# Patient Record
Sex: MALE | Race: WHITE
[De-identification: names, ages, dates, MRNs, and addresses within clinical notes are randomized per-mention and may not be internally consistent; named-entity substitution may affect disease eponyms.]

---

## 2017-06-13 ENCOUNTER — HOSPITAL ENCOUNTER (EMERGENCY)
Dept: HOSPITAL 25 - UCEAST | Age: 57
Discharge: HOME | End: 2017-06-13
Payer: COMMERCIAL

## 2017-06-13 VITALS — SYSTOLIC BLOOD PRESSURE: 116 MMHG | DIASTOLIC BLOOD PRESSURE: 66 MMHG

## 2017-06-13 DIAGNOSIS — S61.214A: Primary | ICD-10-CM
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
## 2017-06-13 DIAGNOSIS — Z79.82: ICD-10-CM
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
## 2017-06-13 PROCEDURE — G0463 HOSPITAL OUTPT CLINIC VISIT: HCPCS

## 2017-06-13 PROCEDURE — 99212 OFFICE O/P EST SF 10 MIN: CPT

## 2017-06-13 NOTE — UC
Skin Complaint HPI





- HPI Summary


HPI Summary: 





PATIENT PRESENTS TO  WITH SMALL .5CM LACERATION TO THE DISTAL TIP OF THE 

RIGHT RING FINGER 2 DAYS.  TODAY, HE NOTES TO WORSENING PAIN AND SLIGHT REDNESS 

AROUND THE AREA WITHOUT RADIATION OF PAIN.  DENIES NUMBNESS, TINGLING.  TAKES 

BP MEDICATION AND DAILY ASPIRIN.  HAS NOT TAKEN ANYTHING FOR THE PAIN.  HE HAS 

WRAPPED WITH GAUZE 2 DAYS.





- History of Current Complaint


Chief Complaint: UCLaceration


Time Seen by Provider: 06/13/17 14:41


Stated Complaint: FINGER LACERATION


Hx Obtained From: Patient


Onset/Duration: Gradual Onset


Skin Exposure Onset/Duration: Minutes Ago


Timing: Constant


Onset Severity: Moderate


Current Severity: Mild


Pain Intensity: 2


Pain Scale Used: 0-10 Numeric


Location: Hand (Right)


Character: Pain


Aggravating: Nothing


Alleviating: Nothing


Associated Signs & Symptoms: Positive: Negative


Related History: Trauma





- Allergy/Home Medications


Allergies/Adverse Reactions: 


 Allergies











Allergy/AdvReac Type Severity Reaction Status Date / Time


 


Gabapentin AdvReac Severe Hallucinati Verified 06/13/17 13:19





   ons  














Review of Systems


Constitutional: Negative


Skin: Other


Eyes: Negative


Respiratory: Negative


Cardiovascular: Negative


Motor: Negative


Neurovascular: Negative


Musculoskeletal: Negative


Neurological: Negative


Psychological: Negative


All Other Systems Reviewed And Are Negative: Yes





PMH/Surg Hx/FS Hx/Imm Hx


Previously Healthy: Yes





- Surgical History


Surgical History: Yes


Surgery Procedure, Year, and Place: orth, HIP AND FEMUR RECONSTRUCTION





- Family History


Known Family History: Positive: Unknown





- Social History


Occupation: Employed Full-time


Lives: With Family


Alcohol Use: Occasionally


Substance Use Type: None


Smoking Status (MU): Never Smoked Tobacco


Have You Smoked in the Last Year: No





- Immunization History


Most Recent Tetanus Shot: 2012





Physical Exam


Triage Information Reviewed: Yes


Appearance: Well-Appearing, No Pain Distress, Well-Nourished


Vital Signs: 


 Initial Vital Signs











Temp  97.8 F   06/13/17 13:15


 


Pulse  65   06/13/17 13:15


 


Resp  16   06/13/17 13:15


 


BP  116/66   06/13/17 13:15


 


Pulse Ox  100   06/13/17 13:15











Vital Signs Reviewed: Yes


Eye Exam: Normal


Eyes: Positive: Conjunctiva Clear


Neck exam: Normal


Neck: Positive: Supple, Nontender, No Lymphadenopathy


Respiratory Exam: Normal


Respiratory: Positive: Chest non-tender, Lungs clear


Cardiovascular Exam: Normal


Cardiovascular: Positive: RRR


Neurological Exam: Normal


Neurological: Positive: Alert


Psychological Exam: Normal


Psychological: Positive: Normal Response To Family


Skin: Positive: Other - SMALL .5CM LACERATION TO THE DISTAL TIP OF THE RIGHT 

RING FINGER WITH SLIGHT ERYTHEMA.





Course/Dx





- Course


Course Of Treatment: 2 DAYS S/P LACERATION TO THE FINGER BY KASHIF.  LAST 

TETANUS 2012. SMALL .5CM LACERATION TO THE DISTAL TIP OF THE RIGHT RING FINGER 

WITH SLIGHT ERYTHEMA.  WORSENING PAIN.  KEFLEX GIVEN FOR POSS CELLULITIS.





- Differential Diagnoses - Skin Complaint


Differential Diagnoses: Cellulitis, Other - INFECTION, LACERATION, PUNCTURE 

WOUND, ABRASION





- Diagnoses


Provider Diagnoses: LACERATION WITHOUT REPAIR





Discharge





- Discharge Plan


Condition: Stable


Disposition: HOME


Prescriptions: 


Cephalexin CAP* [Keflex CAP*] 250 mg PO QID #20 cap


Patient Education Materials:  Cellulitis (ED)


Referrals: 


Deuce Quinones MD [Primary Care Provider] - 


Additional Instructions: 


FOLLOW UP AS NEEDED


IF YOU DEVELOP ANY RED STREAKING, DRAINAGE, WARMTH OR SWELLING, RETURN TO  

IMMEDIATELY.

## 2019-01-24 ENCOUNTER — HOSPITAL ENCOUNTER (EMERGENCY)
Dept: HOSPITAL 25 - ED | Age: 59
Discharge: HOME | End: 2019-01-24
Payer: COMMERCIAL

## 2019-01-24 VITALS — DIASTOLIC BLOOD PRESSURE: 76 MMHG | SYSTOLIC BLOOD PRESSURE: 122 MMHG

## 2019-01-24 DIAGNOSIS — I10: ICD-10-CM

## 2019-01-24 DIAGNOSIS — I48.91: ICD-10-CM

## 2019-01-24 DIAGNOSIS — E11.9: ICD-10-CM

## 2019-01-24 DIAGNOSIS — R00.2: Primary | ICD-10-CM

## 2019-01-24 DIAGNOSIS — I44.30: ICD-10-CM

## 2019-01-24 LAB
ALBUMIN SERPL BCG-MCNC: 4.2 G/DL (ref 3.2–5.2)
ALBUMIN/GLOB SERPL: 1.9 {RATIO} (ref 1–3)
ALP SERPL-CCNC: 48 U/L (ref 34–104)
ALT SERPL W P-5'-P-CCNC: 27 U/L (ref 7–52)
ANION GAP SERPL CALC-SCNC: 7 MMOL/L (ref 2–11)
AST SERPL-CCNC: 16 U/L (ref 13–39)
BASOPHILS # BLD AUTO: 0.2 10^3/UL (ref 0–0.2)
BUN SERPL-MCNC: 21 MG/DL (ref 6–24)
BUN/CREAT SERPL: 20.8 (ref 8–20)
CALCIUM SERPL-MCNC: 9.1 MG/DL (ref 8.6–10.3)
CHLORIDE SERPL-SCNC: 106 MMOL/L (ref 101–111)
EOSINOPHIL # BLD AUTO: 0.3 10^3/UL (ref 0–0.6)
GLOBULIN SER CALC-MCNC: 2.2 G/DL (ref 2–4)
GLUCOSE SERPL-MCNC: 186 MG/DL (ref 70–100)
HCO3 SERPL-SCNC: 23 MMOL/L (ref 22–32)
HCT VFR BLD AUTO: 43 % (ref 42–52)
HGB BLD-MCNC: 14.6 G/DL (ref 14–18)
INR PPP/BLD: 0.96 (ref 0.77–1.02)
LYMPHOCYTES # BLD AUTO: 2 10^3/UL (ref 1–4.8)
MCH RBC QN AUTO: 30 PG (ref 27–31)
MCHC RBC AUTO-ENTMCNC: 34 G/DL (ref 31–36)
MCV RBC AUTO: 87 FL (ref 80–94)
MONOCYTES # BLD AUTO: 1.1 10^3/UL (ref 0–0.8)
NEUTROPHILS # BLD AUTO: 3.5 10^3/UL (ref 1.5–7.7)
NRBC # BLD AUTO: 0 10^3/UL
NRBC BLD QL AUTO: 0
PLATELET # BLD AUTO: 182 10^3/UL (ref 150–450)
POTASSIUM SERPL-SCNC: 3.9 MMOL/L (ref 3.5–5)
PROT SERPL-MCNC: 6.4 G/DL (ref 6.4–8.9)
RBC # BLD AUTO: 4.91 10^6/UL (ref 4–5.4)
SODIUM SERPL-SCNC: 136 MMOL/L (ref 135–145)
TROPONIN I SERPL-MCNC: 0 NG/ML (ref ?–0.04)
TSH SERPL-ACNC: 5.62 MCIU/ML (ref 0.34–5.6)
WBC # BLD AUTO: 7 10^3/UL (ref 3.5–10.8)

## 2019-01-24 PROCEDURE — 71045 X-RAY EXAM CHEST 1 VIEW: CPT

## 2019-01-24 PROCEDURE — 36415 COLL VENOUS BLD VENIPUNCTURE: CPT

## 2019-01-24 PROCEDURE — 84443 ASSAY THYROID STIM HORMONE: CPT

## 2019-01-24 PROCEDURE — 80053 COMPREHEN METABOLIC PANEL: CPT

## 2019-01-24 PROCEDURE — 85610 PROTHROMBIN TIME: CPT

## 2019-01-24 PROCEDURE — 84484 ASSAY OF TROPONIN QUANT: CPT

## 2019-01-24 PROCEDURE — 83880 ASSAY OF NATRIURETIC PEPTIDE: CPT

## 2019-01-24 PROCEDURE — 96374 THER/PROPH/DIAG INJ IV PUSH: CPT

## 2019-01-24 PROCEDURE — 85025 COMPLETE CBC W/AUTO DIFF WBC: CPT

## 2019-01-24 PROCEDURE — 83605 ASSAY OF LACTIC ACID: CPT

## 2019-01-24 PROCEDURE — 99284 EMERGENCY DEPT VISIT MOD MDM: CPT

## 2019-01-24 PROCEDURE — 93005 ELECTROCARDIOGRAM TRACING: CPT

## 2019-01-24 NOTE — XMS REPORT
Continuity of Care Document (CCD)

 Created on:2019



Patient:Ameya Palomo

Sex:Male

:1960

External Reference #:[id number]





Demographics







 Address  [address]



   [place] [address]

 

 Home Phone  [phone]

 

 Mobile Phone  [phone]

 

 Email Address  [email or web address]

 

 Preferred Language  en

 

 Marital Status  Not  or 

 

 Restorationism Affiliation  Unknown

 

 Race  White

 

 Ethnic Group  Not  or 









Author







 Name  Ines Emperatriz









Support







 Name  Relationship  Address  Phone

 

 Brittney Palomo  Wife  [address]  [phone]



     [place] [address]  









Care Team Providers







 Name  Role  Phone

 

 Deuce Quinones MD  Primary Care Physician  Unavailable









Payers







 Type  Date  Identification Numbers  Payment Provider  Subscriber

 

     Policy Number: [id number]  [employer]  Ameya Palomo









 PayID: [id number]   [address] [id number]









 [place] [address]







Advance Directives







 Description

 

 No Information Available







Problems







 Date  Description  Provider  Status

 

 Onset: 2014  Atrial fibrillation  Rosa Painting M.D.  Active

 

 Onset: 2014  Aneurysm of thoracic aorta  Rosa Painting M.D.  Active

 

 Onset: 2014  Mixed hyperlipidemia  Rosa Painting M.D.  Active

 

 Onset: 2014  Benign essential hypertension  Rosa Painting M.D.  Active

 

 Onset: 2014  Angina pectoris  Rosa Painting M.D.  Active

 

 Onset: 10/30/2014  Hypersomnia with sleep apnea  Zak Gordon M.D.  Active

 

 Onset: 2015  Obstructive sleep apnea syndrome  Zak Gordon M.D.  
Active

 

 Onset: 2015  Atrial flutter  Rosa Painting M.D.  Active

 

 Onset: 2015  Essential hypertension  Rosa Painting M.D.  Active

 

 Onset: 2017  Paroxysmal atrial fibrillation  Rosa Painting M.D.  Active







Family History







 Date  Family Member(s)  Problem(s)  Comments

 

   Father  Lung Cancer  

 

   Father  ALS  

 

   Father  Chronic Obstructive Pulmonary Disease (COPD)  

 

   Mother  Diabetes  

 

   Mother  Cerebrovascular Accident (CVA)  

 

   Mother  Rheumatiod arthritis  







Social History







 Type  Date  Description  Comments

 

 Birth Sex    Unknown  

 

 Marital Status      

 

 Lives With    Wife  

 

 Lives With    Children  3 children

 

 Occupation    Currently Working  computer support

 

 Tobacco Use  Start: Unknown  Never Smoked Cigarettes  

 

 Smoking Status  Reviewed: 19  Never Smoked Cigarettes  

 

 ETOH Use    Rarely consumes beer  

 

 Tobacco Use  Start: Unknown  Patient has never smoked  

 

 Recreational Drug Use    Denies Drug Use  

 

 Exercise Type/Frequency    Does not exercise  







Allergies, Adverse Reactions, Alerts







 Date  Description  Reaction  Status  Severity  Comments

 

 2014  Gabapentin  hallucinations  Active  Severe  per [facility] EMR chart

 

 2014  Metoprolol    Active    Per Dr. Allison see paper chart

 

 2013  NKDA    Inactive    

 

 2014  NKDA    Inactive    







Medications







 Medication  Date  Status  Form  Strength  Qnty  SIG  Indications  Ordering



                 Provider

 

 Metformin HCL    Active  Tablets  500mg    1 by    Unknown



   016          mouth    



             once a    



             day in    



             the a.m.    

 

 Propafenone    Active  Tablets  150mg  270tab  1 tablet  I48.0  Rosa



 HCL  014        s  by mouth    Garima,



             three    M.D.



             times a    



             day    

 

 Potassium    Active  Capsules ER  10Meq  180cap  2 tablets    Rosa



 Chloride ER  014        s  by mouth    Cecil,



             every day    M.D.

 

 Benazepril HCL    Active  Tablets  5mg  90tabs  1 tab by  I10  Rosa



   014          mouth    Garima,



             every day    M.D.



             hs.    

 

 Cardizem CD    Active  Caps ER  120mg  180cap  1 by  I48.0  Rosa



   014    24HR    s  mouth    Cecil,



             twice a    M.D.



             day    

 

 Pravachol    Active  Tablets  40mg  90tabs  1 tablet    Unknown



   000          by mouth    



             every    



             night at    



             bedtime    

 

 Aspirin    Active  Tablets  81mg    1 by    Unknown



   000          mouth    



             every day    

 

 Aleve    Active  Capsules  220mg  60caps  1 by    Unknown



   000          mouth    



             twice a    



             day as    



             needed    

 

 Cpap    Active  Device          Unknown



   000              

 

 Doxycycline    Active  Capsules DR  40mg    q tab    Unknown



   000          daily    



             util    



                 

 

                 

 

 Propafenone    Hx  Caps ER  225mg  180cap  1 by  427.31  Rosa



 HCL ER  014 -    12HR    s  mouth    Garima,



             twice a    M.D.



   015          day    

 

 Multaq    Hx  Tablets  400mg  180tab  1 by    Rosa



   014 -        s  mouth    Cecil,



             twice a    M.D.



   014          day    

 

 Flexeril    Hx  Tablets  10mg  60tabs  1 po tid    Alvarado



   012 -          prn    Lashaun,



   05/10/2              M.JERSON



   016              

 

 Terazosin HCL  0  Hx  Capsules  1mg  270cap  1 po    Unknown



   000 -        s  qhs(pt    



             stopped    



   017          taking 1    



             month    



             ago)    

 

 Benazepril HCL    Hx  Tablets  20mg  30tabs  by mouth  427.31  Unknown



   000 -          every day    



                 



   014              

 

 Diazepam  0  Hx    120mg    1 twice    Unknown



   000 -          as day    



                 



   016              

 

 Oxycodone HCL  0  Hx        rarely    Unknown



   000 -          prn for    



   05/10/2          leg    



   016          cramps    







Immunizations







 Description

 

 No Information Available







Vital Signs







 Date  Vital  Result  Comment

 

 2019  8:33am  Height  72 inches  6'0"









 Weight  213.00 lb  

 

 Heart Rate  62 /min  

 

 BP Systolic Sitting  115 mmHg  Lue reg cuff

 

 BP Diastolic Sitting  75 mmHg  Lue reg cuff

 

 BP Systolic Standing  112 mmHg  Lue

 

 BP Diastolic Standing  70 mmHg  Lue

 

 Respiratory Rate  18 /min  

 

 BMI (Body Mass Index)  28.9 kg/m2  









 2019  8:56am  Height  72 inches  6'0"









 Weight  213.00 lb  

 

 Heart Rate  56 /min  

 

 BP Systolic Sitting  120 mmHg  Lue large cuff

 

 BP Diastolic Sitting  80 mmHg  Lue large cuff

 

 Respiratory Rate  16 /min  

 

 O2 % BldC Oximetry  98 %  On Ra

 

 BMI (Body Mass Index)  28.9 kg/m2  









 2018  9:35am  Height  72 inches  6'0"









 Weight  206.00 lb  with shoes

 

 Heart Rate  60 /min  

 

 BP Systolic Sitting  128 mmHg  Rue reg cuff

 

 BP Diastolic Sitting  70 mmHg  Rue reg cuff

 

 BP Systolic Standing  122 mmHg  Rue reg cuff

 

 BP Diastolic Standing  78 mmHg  Rue reg cuff

 

 Respiratory Rate  16 /min  

 

 BMI (Body Mass Index)  27.9 kg/m2  

 

 Ejection Fraction  55-60%  as of 17 echo









 12/15/2017  1:56pm  Height  72 inches  6'0"









 Weight  215.00 lb  with shoes

 

 Heart Rate  64 /min  

 

 BP Systolic Sitting  120 mmHg  Rue lg cuff

 

 BP Diastolic Sitting  70 mmHg  Rue lg cuff

 

 BP Systolic Standing  120 mmHg  Rue lg cuff

 

 BP Diastolic Standing  72 mmHg  Rue lg cuff

 

 Respiratory Rate  17 /min  

 

 BMI (Body Mass Index)  29.2 kg/m2  









 2017  8:53am  Height  72 inches  6'0"









 Weight  208.00 lb  without shoes

 

 Heart Rate  62 /min  

 

 BP Systolic Sitting  132 mmHg  Rue reg cuff

 

 BP Diastolic Sitting  80 mmHg  Rue reg cuff

 

 BP Systolic Standing  124 mmHg  Rue reg cuff

 

 BP Diastolic Standing  80 mmHg  Rue reg cuff

 

 Respiratory Rate  16 /min  

 

 BMI (Body Mass Index)  28.2 kg/m2  

 

 Ejection Fraction  50-55%  date 2016 ECHO









 2017  9:47am  Height  70 inches  5'10"









 Heart Rate  61 /min  

 

 BP Systolic Sitting  116 mmHg  

 

 BP Diastolic Sitting  70 mmHg  

 

 Respiratory Rate  14 /min  

 

 Pain Level  0  

 

 O2 % BldC Oximetry  96 %  









 2016  8:13am  Height  70 inches  5'10"









 Weight  211.00 lb  No shoes

 

 Heart Rate  58 /min  

 

 BP Systolic Sitting  118 mmHg  Rue lrg cuff

 

 BP Diastolic Sitting  72 mmHg  Rue lrg cuff

 

 BP Systolic Standing  110 mmHg  Rue lrg cuff

 

 BP Diastolic Standing  74 mmHg  Rue lrg cuff

 

 Respiratory Rate  15 /min  

 

 BMI (Body Mass Index)  30.3 kg/m2  

 

 Ejection Fraction  50-55%  2016









 06/10/2016  8:07am  Height  70 inches  5'10"









 Weight  215.00 lb  w/ shoes

 

 Heart Rate  58 /min  reg

 

 BP Systolic Sitting  110 mmHg  Rue, lg cuff

 

 BP Diastolic Sitting  72 mmHg  Rue, lg cuff

 

 BP Systolic Standing  104 mmHg  Rue

 

 BP Diastolic Standing  70 mmHg  Rue

 

 Respiratory Rate  16 /min  

 

 BMI (Body Mass Index)  30.8 kg/m2  

 

 Ejection Fraction  50-55%  as of 16 echo









 2016  8:52am  Height  70 inches  5'10"









 Weight  213.00 lb  

 

 Heart Rate  61 /min  

 

 BP Systolic Sitting  130 mmHg  

 

 BP Diastolic Sitting  76 mmHg  

 

 Respiratory Rate  18 /min  

 

 O2 % BldC Oximetry  98 %  

 

 BMI (Body Mass Index)  30.6 kg/m2  









 2015 12:07pm  Height  70 inches  5'10"









 Weight  213.00 lb  with boots

 

 Heart Rate  80 /min  

 

 BP Systolic Sitting  128 mmHg  Ra reg cuff

 

 BP Diastolic Sitting  80 mmHg  Ra reg cuff

 

 BP Systolic Standing  128 mmHg  Ra reg cuff

 

 BP Diastolic Standing  82 mmHg  Ra reg cuff

 

 Respiratory Rate  17 /min  

 

 BMI (Body Mass Index)  30.6 kg/m2  

 

 Ejection Fraction  50%  date 14 ECHO









 2015  8:44am  Height  70 inches  5'10"









 Heart Rate  58 /min  

 

 BP Systolic  122 mmHg  

 

 BP Diastolic  82 mmHg  

 

 Respiratory Rate  14 /min  

 

 O2 % BldC Oximetry  98 %  









 2015 10:22am  Height  70 inches  5'10"









 Weight  213.50 lb  

 

 Heart Rate  62 /min  reg

 

 BP Systolic Sitting  124 mmHg  Ra, reg cuff

 

 BP Diastolic Sitting  90 mmHg  Ra, reg cuff

 

 BP Systolic Standing  124 mmHg  Ra

 

 BP Diastolic Standing  90 mmHg  Ra

 

 Respiratory Rate  16 /min  

 

 BMI (Body Mass Index)  30.6 kg/m2  

 

 Ejection Fraction  50%  14  9:00am  Heart Rate  56 /min  









 BP Systolic Sitting  142 mmHg  

 

 BP Diastolic Sitting  79 mmHg  

 

 Respiratory Rate  20 /min  

 

 O2 % BldC Oximetry  97 %  









 2015 10:04am  Height  70 inches  5'10"









 Weight  222.00 lb  with shoes

 

 Heart Rate  70 /min  

 

 BP Systolic Sitting  140 mmHg  LA, Lg cuff

 

 BP Diastolic Sitting  96 mmHg  LA, Lg cuff

 

 BP Systolic Standing  134 mmHg  LA

 

 BP Diastolic Standing  92 mmHg  LA

 

 Respiratory Rate  16 /min  

 

 BMI (Body Mass Index)  31.9 kg/m2  









 2015  2:45pm  Height  70 inches  5'10"









 Weight  225.00 lb  

 

 Heart Rate  66 /min  

 

 BP Systolic Sitting  136 mmHg  

 

 BP Diastolic Sitting  70 mmHg  

 

 Respiratory Rate  20 /min  

 

 O2 % BldC Oximetry  98 %  

 

 BMI (Body Mass Index)  32.3 kg/m2  









 2015  8:38am  Height  72 inches  6'0"









 Weight  224.00 lb  with shoes

 

 Heart Rate  66 /min  regular

 

 BP Systolic Sitting  122 mmHg  right arm reg cuff

 

 BP Diastolic Sitting  86 mmHg  right arm reg cuff

 

 BP Systolic Standing  120 mmHg  right arm reg cuff

 

 BP Diastolic Standing  88 mmHg  right arm reg cuff

 

 Respiratory Rate  18 /min  

 

 BMI (Body Mass Index)  30.4 kg/m2  









 2014  7:55am  Height  72 inches  6'0"









 Weight  217.00 lb  no shoes

 

 Heart Rate  72 /min  

 

 BP Systolic Sitting  120 mmHg  Ra, reg cuff

 

 BP Diastolic Sitting  80 mmHg  Ra, reg cuff

 

 BP Systolic Standing  126 mmHg  

 

 BP Diastolic Standing  80 mmHg  

 

 Respiratory Rate  16 /min  

 

 BMI (Body Mass Index)  29.4 kg/m2  









 10/30/2014  8:23am  Height  72 inches  6'0"









 Weight  215.00 lb  

 

 Heart Rate  66 /min  

 

 BP Systolic Sitting  114 mmHg  left arm, large cuff

 

 BP Diastolic Sitting  82 mmHg  left arm, large cuff

 

 Respiratory Rate  16 /min  

 

 O2 % BldC Oximetry  97 %  Room air

 

 BMI (Body Mass Index)  29.2 kg/m2  

 

 Neck Circumference in inches  17.25  









 2014  8:28am  Height  72 inches  6'0"









 Weight  218.00 lb  without shoes

 

 Heart Rate  68 /min  

 

 BP Systolic Sitting  130 mmHg  L arm reg cuff

 

 BP Diastolic Sitting  82 mmHg  L arm reg cuff

 

 BP Systolic Standing  130 mmHg  

 

 BP Diastolic Standing  76 mmHg  

 

 Respiratory Rate  18 /min  

 

 BMI (Body Mass Index)  29.6 kg/m2  









 2014  8:40am  Height  72 inches  6'0"









 Weight  219.00 lb  without shoes

 

 Heart Rate  60 /min  

 

 BP Systolic Sitting  124 mmHg  LA lg cuff

 

 BP Diastolic Sitting  86 mmHg  LA lg cuff

 

 BP Systolic Lying Down  122 mmHg  LA lg cuff Stand

 

 BP Diastolic Lying Down  86 mmHg  LA lg cuff Stand

 

 BMI (Body Mass Index)  29.7 kg/m2  









 2014  8:13am  Height  72 inches  6'0"









 Weight  219.50 lb  With shoes

 

 Heart Rate  76 /min  

 

 BP Systolic Sitting  110 mmHg  Ra, Reg cuff

 

 BP Diastolic Sitting  78 mmHg  Ra, Reg cuff

 

 BP Systolic Standing  112 mmHg  

 

 BP Diastolic Standing  80 mmHg  

 

 BMI (Body Mass Index)  29.8 kg/m2  









 2014  9:42am  Height  72 inches  6'0"









 Weight  219.00 lb  without shoes

 

 Heart Rate  70 /min  

 

 BP Systolic Sitting  130 mmHg  Ra reg cuff

 

 BP Diastolic Sitting  70 mmHg  Ra reg cuff

 

 BP Systolic Standing  122 mmHg  Ra reg cuff

 

 BP Diastolic Standing  70 mmHg  Ra reg cuff

 

 Respiratory Rate  17 /min  

 

 BMI (Body Mass Index)  29.7 kg/m2  









 2014  7:55am  Height  72 inches  6'0"









 Weight  225.00 lb  with shoes

 

 Heart Rate  70 /min  

 

 BP Systolic Sitting  138 mmHg  Ra reg cuff

 

 BP Diastolic Sitting  80 mmHg  Ra reg cuff

 

 BP Systolic Standing  140 mmHg  Ra reg cuff

 

 BP Diastolic Standing  80 mmHg  Ra reg cuff

 

 Respiratory Rate  17 /min  

 

 BMI (Body Mass Index)  30.5 kg/m2  









 2014  8:14am  Height  72 inches  6'0"









 Weight  217.00 lb  

 

 Heart Rate  68 /min  

 

 BP Systolic Sitting  102 mmHg  Ra large cuff

 

 BP Diastolic Sitting  68 mmHg  Ra large cuff

 

 BP Systolic Standing  108 mmHg  Ra

 

 BP Diastolic Standing  72 mmHg  Ra

 

 Respiratory Rate  16 /min  

 

 BMI (Body Mass Index)  29.4 kg/m2  









 2013  8:30am  Height  72 inches  6'0"









 Weight  215.00 lb  

 

 Heart Rate  62 /min  

 

 BP Systolic  123 mmHg  

 

 BP Diastolic  78 mmHg  

 

 BMI (Body Mass Index)  29.2 kg/m2  







Results







 Test  Date  Facility  Test  Result  H/L  Range  Note

 

 Comp Metabolic Panel  2018  [facility]  Sodium  139 mmol/L  N
  135-145  



               



     [place] [address] [phone]          









 Potassium  4.3 mmol/L  N  3.5-5.0  

 

 Chloride  106 mmol/L  N  101-111  

 

 Co2 Carbon Dioxide  27 mmol/L  N  22-32  

 

 Anion Gap  6 mmol/L  N  2-11  

 

 Glucose  157 mg/dL  High    

 

 Blood Urea Nitrogen  13 mg/dL  N  6-24  

 

 Creatinine  1.06 mg/dL  N  0.67-1.17  

 

 BUN/Creatinine Ratio  12.3  N  8-20  

 

 Calcium  9.6 mg/dL  N  8.6-10.3  

 

 Total Protein  6.8 g/dL  N  6.4-8.9  

 

 Albumin  4.8 g/dL  N  3.2-5.2  

 

 Globulin  2.0 g/dL  N  2-4  

 

 Albumin/Globulin Ratio  2.4  N  1-3  

 

 Total Bilirubin  1.00 mg/dL  N  0.2-1.0  

 

 Alkaline Phosphatase  53 U/L  N    

 

 Alt  21 U/L  N  7-52  

 

 Ast  16 U/L  N  13-39  

 

 Egfr Non-  71.5    >60  

 

 Egfr   86.5    >60  1









 Lipid Profile  2018  [facility]  Triglycerides  86 mg/dL      
2



 (Trig/Chol/HDL)              



     [place] [address] [phone]          









 Cholesterol  134 mg/dL      3

 

 HDL Cholesterol  36.3 mg/dL      4

 

 LDL Cholesterol  81 mg/dL      5









 Laboratory test  2018  [facility]  Creatine Kinase(CK)  112 U/
L  N    



 finding     [address]          



     [place] [address] [phone]          

 

 Lipid Panel -  2018  [facility]  Creatine Kinase(CK)  <
pending      



 [facility]     [address]    >      



     [place] [address] [phone]          

 

 Laboratory test  12/15/2017  [facility]  Ast (Sgot)  <pending      



 finding     [address]IVE    >      



     [place] [address] [phone]          

 

 Lipid Profile  2016  [facility]  Triglycerides  84 mg/dL  N  
  6



 (Trig/Chol/HDL)    [address]          



     [place], [place] [id number] [phone]          









 Cholesterol  140 mg/dL  N    7

 

 HDL Cholesterol  35.4 mg/dL  N    8

 

 LDL Cholesterol  88 mg/dL  N    9









 Comp Metabolic Panel  2016  [facility]  Sodium  137 mmol/L  N
  133-145  



     [address]          



     [place] [address] [phone]          









 Potassium  4.5 mmol/L  N  3.5-5.0  

 

 Chloride  104 mmol/L  N  101-111  

 

 Co2 Carbon Dioxide  29 mmol/L  N  22-32  

 

 Anion Gap  4 mmol/L  N  2-11  

 

 Glucose  146 mg/dL  High    

 

 Blood Urea Nitrogen  16 mg/dL  N  6-24  

 

 Creatinine  1.14 mg/dL  N  0.67-1.17  

 

 BUN/Creatinine Ratio  14.0  N  8-20  

 

 Calcium  9.5 mg/dL  N  8.6-10.3  

 

 Total Protein  6.6 g/dL  N  6.4-8.9  

 

 Albumin  4.3 g/dL  N  3.2-5.2  

 

 Globulin  2.3 g/dL  N  2-4  

 

 Albumin/Globulin Ratio  1.9  N  1-3  

 

 Total Bilirubin  0.80 mg/dL  N  0.2-1.0  

 

 Alkaline Phosphatase  49 U/L  N    

 

 Alt  21 U/L  N  7-52  

 

 Ast  15 U/L  N  13-39  

 

 Egfr Non-  66.0  N  >60  

 

 Egfr   84.9  N  >60  10









 Lipid Panel -  2016  [facility]  Creatine  68 U/L  N    



 [facility]    [address]  Kinase(CK)        



     [place] [address] [phone]          

 

 Inr/Protime  2014  [facility]  Inr  0.99  N  0.85-1.06  



     [address]          



     [place] [address] [phone]          

 

 Laboratory test  2014  [facility]  B Type  21 pg/mL  N    11



 finding    [address]  Natriuretic        



     [place] [address]  Peptide        



     [phone]          

 

 Comp Metabolic  2014  [facility]  Sodium  137  N  133-145  



 Panel    [address]    mmol/L      



     [place], [place] [id number] [phone]          









 Potassium  4.0 mmol/L  N  3.7-5.6  

 

 Chloride  105 mmol/L  N  101-111  

 

 Co2 Carbon Dioxide  25 mmol/L  N  22-32  

 

 Anion Gap  7 mmol/L  N  2-11  

 

 Glucose  109 mg/dL  High    

 

 Blood Urea Nitrogen  17 mg/dL  N  6-24  

 

 Creatinine  1.18 mg/dL  High  0.67-1.17  

 

 BUN/Creatinine Ratio  14.4  N  8-20  

 

 Calcium  9.4 mg/dL  N  8.6-10.3  

 

 Total Protein  6.6 g/dL  N  6.4-8.9  

 

 Albumin  4.7 g/dL  N  3.2-5.2  

 

 Globulin  1.9 g/dL  Low  2-4  

 

 Albumin/Globulin Ratio  2.5  N  1-3  

 

 Total Bilirubin  0.90 mg/dL  N  0.2-1.0  

 

 Alkaline Phosphatase  39 U/L  N    

 

 Alt  26 U/L  N  7-52  

 

 Ast  18 U/L  N  13-39  

 

 Egfr Non-  64.1  N  >60  

 

 Egfr   82.4  N  >60  12









 Laboratory test  2014  [facility]  Magnesium  2.1 mg/dL  N  
1.9-2.7  



 finding    [address]  [place] [address] [phone]          









 Creatine Kinase  207 U/L  N    









 CKMB  2014  [facility]  CKMB  ng/mL  5.5 ng/mL  N  0.6-6.3  



     [address]  [place] [address] [phone]          

 

 Laboratory test  2014  [facility]  Troponin I  0.01 ng/mL  N  
<0.03  13



 finding    [address]  [place] [address] [phone]          









 TSH (Thyroid Stimulating Horm)  3.40 IU/mL  N  0.34-5.60  









 CBC Auto Diff  2014  [facility]  White Blood  7.9 10^3/uL  N  
4.8-10.8  



     [address]  Count        



     [place] [address] [phone]          









 Red Blood Count  5.59 10^6/uL  High  4.0-5.4  

 

 Hemoglobin  16.9 g/dL  N  14.0-18.0  

 

 Hematocrit  48 %  N  42-52  

 

 Mean Corpuscular Volume  86 fL  N  80-94  

 

 Mean Corpuscular Hemoglobin  30 pg  N  27-31  

 

 Mean Corpuscular HGB Conc  35 g/dL  N  31-36  

 

 Red Cell Distribution Width  13 %  N  10.5-15  

 

 Platelet Count  191 10^3/uL  N  150-450  

 

 Mean Platelet Volume  10 um3  N  7.4-10.4  

 

 Abs Neutrophils  4.9 10^3/uL  N  1.5-7.7  

 

 Abs Lymphocytes  1.7 10^3/uL  N  1.0-4.8  

 

 Abs Monocytes  1.0 10^3/uL  High  0-0.8  

 

 Abs Eosinophils  0.2 10^3/uL  N  0-0.6  

 

 Abs Basophils  0.1 10^3/uL  N  0-0.2  

 

 Abs Nucleated RBC  0.01 10^3/uL  N    

 

 Granulocyte %  61.2 %  N  38-83  

 

 Lymphocyte %  21.8 %  Low  25-47  

 

 Monocyte %  13.2 %  High  1-9  

 

 Eosinophil %  2.5 %  N  0-6  

 

 Basophil %  1.3 %  N  0-2  

 

 Nucleated Red Blood Cells %  0.1  N    









 1  *******Because ethnic data is not always readily available,



   this report includes an eGFR for both -Americans and



   non- Americans.****



   The National Kidney Disease Education Program (NKDEP) does



   not endorse the use of the MDRD equation for patients that



   are not between the ages of 18 and 70, are pregnant, have



   extremes of body size, muscle mass, or nutritional status,



   or are non- or non-.



   According to the National Kidney Foundation, irrespective of



   diagnosis, the stage of the disease is based on the level of



   kidney function:



   Stage Description                      GFR(mL/min/1.73 m(2))



   1     Kidney damage with normal or decreased GFR       90



   2     Kidney damage with mild decrease in GFR          60-89



   3     Moderate decrease in GFR                         30-59



   4     Severe decrease in GFR                           15-29



   5     Kidney failure                       <15 (or dialysis)

 

 2  Desirable: <150



   Borderline High: 150-199



   High: 200-499



   Very High: >500

 

 3  Desirable: <200



   Borderline High: 200-239



   High: >239

 

 4  Low: <40



   Desirable: 40-60



   High: >60

 

 5  Desirable: <100



   Near Optimal: 100-129



   Borderline High: 130-159



   High: 160-189



   Very High: >189

 

 6  Desirable <150



   Borderline high 150-199



   High 200-499



   Very High >500

 

 7  Desirable <200



   Borderline high 200-239



   High >239

 

 8  Low <40



   Desirable: 40-60



   High: >60

 

 9  Desirable: <100 mg/dL



   Near Optimal: 100-129 mg/dL



   Borderline High: 130-159 mg/dL



   High: 160-189 mg/dL



   Very High: >189 mg/dL

 

 10  *******Because ethnic data is not always readily available,



   this report includes an eGFR for both -Americans and



   non- Americans.****



   The National Kidney Disease Education Program (NKDEP) does



   not endorse the use of the MDRD equation for patients that



   are not between the ages of 18 and 70, are pregnant, have



   extremes of body size, muscle mass, or nutritional status,



   or are non- or non-.



   According to the National Kidney Foundation, irrespective of



   diagnosis, the stage of the disease is based on the level of



   kidney function:



   Stage Description                      GFR(mL/min/1.73 m(2))



   1     Kidney damage with normal or decreased GFR       90



   2     Kidney damage with mild decrease in GFR          60-89



   3     Moderate decrease in GFR                         30-59



   4     Severe decrease in GFR                           15-29



   5     Kidney failure                       <15 (or dialysis)

 

 11  >100 to <200 pg/mL: likely compensated congestive heart



   failure (CHF)



   200 to 400 pg/mL: likely moderate CHF



   >400 pg/mL: likely moderate to severe CHF



   [facility]

 

 12  *******Because ethnic data is not always readily available,



   this report includes an eGFR for both -Americans and



   non- Americans.****



   The National Kidney Disease Education Program (NKDEP) does



   not endorse the use of the MDRD equation for patients that



   are not between the ages of 18 and 70, are pregnant, have



   extremes of body size, muscle mass, or nutritional status,



   or are non- or non-.



   According to the National Kidney Foundation, irrespective of



   diagnosis, the stage of the disease is based on the level of



   kidney function:



   Stage Description                      GFR(mL/min/1.73 m(2))



   1     Kidney damage with normal or decreased GFR       90



   2     Kidney damage with mild decrease in GFR          60-89



   3     Moderate decrease in GFR                         30-59



   4     Severe decrease in GFR                           15-29



   5     Kidney failure                       <15 (or dialysis)

 

 13  Reference Range and Interpretation:



   TnI (ng/mL)             Interpretation



   Less Than 0.03 ng/mL    Not supportive of diagnosis of MI



   0.03 - 0.50 ng/mL       Indeterminate: suggest serial



   studies if clinically indicated.



   Greater than 0.5 ng/mL  Consistent with diagnosis of MI







Procedures







 Date  Code  Description  Status

 

 2018  81145  EKG Tracing & Interpretation  Completed

 

 12/15/2017  38737  EKG Tracing & Interpretation  Completed

 

 2017  69227  EKG Tracing & Interpretation  Completed

 

 2017  21999  ECHO Transthoracic, Real-Time 2D With Doppler And Color  
Completed



     Flow  

 

 2016  05091  EKG Tracing & Interpretation  Completed

 

 06/10/2016  98474  EKG Tracing & Interpretation  Completed

 

 2016  61327  ECHO Transthoracic, Real-Time 2D With Doppler And Color  
Completed



     Flow  

 

 2015  69964  EKG Tracing & Interpretation  Completed

 

 2015  71508  EKG Tracing & Interpretation  Completed

 

 2014  31373  Polysomnography Sleep Staging 4+ Parameters  Completed

 

 2014  27351  EKG Tracing & Interpretation  Completed

 

 2014  59717  EKG Tracing & Interpretation  Completed

 

 2014  38259  EKG Tracing & Interpretation  Completed

 

 2014  50170  EKG Tracing & Interpretation  Completed

 

 2014  40108  EKG Tracing & Interpretation  Completed

 

 2014  84792  Cardiac Event Monitor  Completed

 

 2014  68930  Stress Test  Completed

 

 2014  55924  EKG Tracing & Interpretation  Completed

 

 2014  39461  ECHO Transthoracic, Real-Time 2D With Doppler And Color  
Completed



     Flow  

 

 2014  23340  EKG Tracing & Interpretation  Completed

 

 2013  59817  Xray Knee 3 Views  Completed

 

 2013  54765  ECHO Transthoracic, Real-Time 2D With Doppler And Color  
Completed



     Flow  

 

 2012  57401  Xray Knee 3 Views  Completed

 

 2012  45362  Rad Exam; Knee, Ap&L  Completed

 

 2012  87290  Rad Exam; Femur  Completed







Encounters







 Type  Date  Location  Provider  Dx  Diagnosis

 

 Office Visit  2019  [facility]  Lesley Avila,  G47.33  
Obstructive sleep



   9:00a  [facility]  ZACK, RN, FNP-BC    apnea (adult)



     [facility]      (pediatric)









 R09.81  Nasal congestion

 

 Z68.28  Body mass index (BMI) 28.0-28.9, adult









 Office Visit  2018  9:45a  [facility]  Rosa Painting,  I48.0  
Paroxysmal atrial



     Of [facility]  M.D.    fibrillation









 I10  Essential (primary) hypertension

 

 E78.2  Mixed hyperlipidemia

 

 I71.2  Thoracic aortic aneurysm, without rupture









 Office Visit  12/15/2017  1:45p  [facility]  Rosa Painting,  I48.0  
Paroxysmal atrial



     Of [facility]  M.D.    fibrillation









 I10  Essential (primary) hypertension

 

 E78.2  Mixed hyperlipidemia

 

 E11.8  Type 2 diabetes mellitus with unspecified complications









 Office Visit  2017  9:00a  [facility]  Rosa Painting,  I48.0  
Paroxysmal atrial



     Of [facility]  M.D.    fibrillation









 I10  Essential (primary) hypertension

 

 Z82.49  Family hx of ischem heart dis and oth dis of the circ sys









 Office Visit  2017  [facility]  Lesley  G47.33  Obstructive sleep



   9:30a  [facility]  ZACK Avila, RN,    apnea (adult)



     [facility]  FNP-BC    (pediatric)

 

 Office Visit  2016  [facility]  PK Verduzco  I48.0  Paroxysmal 
atrial



   8:30a  Of [facility]      fibrillation









 I10  Essential (primary) hypertension

 

 I71.2  Thoracic aortic aneurysm, without rupture

 

 E78.2  Mixed hyperlipidemia









 Office Visit  06/10/2016  8:20a  [facility]  Rosa Painting,  I71.2  
Thoracic aortic



     Of [facility] AT [facility]  M.D.    aneurysm,



           without rupture









 I48.0  Paroxysmal atrial fibrillation

 

 I10  Essential (primary) hypertension









 Office Visit  2016  8:45a  [facility]  Zak SK.  G47.33  
Obstructive sleep



     [facility]  KERRY Gordon    apnea (adult)



     [facility]      (pediatric)

 

 Office Visit  2015 11:45a  [facility]  Rosa  I48.0  Paroxysmal 
atrial



     Of [facility]  jessi Painting



       M.D.    









 I10  Essential (primary) hypertension

 

 E78.2  Mixed hyperlipidemia









 Office Visit  2015  [facility]  Zak SK.  327.23  
Obstructive Sleep



   8:15a  Services Of [facility]  KERRY Gordon    Apnea Adult &



           Pediatric

 

 Office Visit  2015  Neurohospitalist  Ketan  780.4  Dizziness &



   2:37p  Clinic  Vianca Dockery M.D.    

 

 Office Visit  2015  [facility] Nicki Lee  427.31  Atrial



   10:15a  [facility]  Garima    Fibrillation



       M.DBernarda    









 401.1  Hypertension Benign

 

 272.2  Hyperlipidemia Mixed









 Office Visit  2015  8:45a  [facility]  Zak SK.  327.23  
Obstructive Sleep



     [facility]  KERRY Gordon    Apnea Adult &



     [facility]      Pediatric

 

 Office Visit  2015  9:45a  [facility]  Rosa  427.31  Atrial



     Of [facility]  Cecil,    Fibrillation



       M.D.    









 427.32  Atrial Flutter









 Office Visit  2015  2:45p  [facility]  Zak SK.  327.23  
Obstructive Sleep



     [facility]  KERRY Gordon    Apnea Adult &



     [facility]      Pediatric

 

 Office Visit  2015  8:30a  [facility]  Sarah Sellers,  327.23  
Obstructive Sleep



     Of [facility]  [place]    Apnea Adult &



           Pediatric









 427.31  Atrial Fibrillation

 

 401.1  Hypertension Benign

 

 441.2  Aneurysm Thoracic W/O Rupture









 Office Visit  2014  7:45a  [facility]  Rosa Painting  427.31  Atrial



     [facility]  M.D.    Fibrillation



     [facility]      









 401.1  Hypertension Benign









 Office Visit  10/30/2014  8:30a  [facility]  Zak SK.  780.53  
Hypersomnia W/



     [facility]  KERRY Gordon    Sleep Apnea



     [facility]      Unspecified

 

 Office Visit  2014  8:15a  [facility]  Rosa  427.31  Atrial



     Of [facility]  Cecil,    Fibrillation



       M.D.    

 

 Office Visit  2014  8:30a  [facility]  Sarah Sellers,  401.1  
Hypertension



     Of [facility]  [place]    Benign









 427.31  Atrial Fibrillation

 

 272.2  Hyperlipidemia Mixed









 Office Visit  2014  8:00a  [facility]  Rosa Painting  427.31  Atrial



     Cardiology Of  M.D.    Fibrillation



     [facility]      









 401.1  Hypertension Benign









 Office Visit  2014  9:30a  [facility]  Sarah Sellers  427.31  Atrial



     Of [facility]  [place]    Fibrillation









 401.1  Hypertension Benign

 

 441.2  Aneurysm Thoracic W/O Rupture









 Office Visit  2014  7:45a  [facility]  427.31  Atrial



     [facility] Of  M.D.    Fibrillation



     [facility]      









 401.1  Hypertension Benign

 

 441.2  Aneurysm Thoracic W/O Rupture

 

 272.2  Hyperlipidemia Mixed









 Office Visit  2014  8:00a  Tammie Painting  427.31  Atrial



     Cardiology Of  M.D.    Fibrillation



     [facility]      









 441.2  Aneurysm Thoracic W/O Rupture

 

 272.2  Hyperlipidemia Mixed

 

 401.1  Hypertension Benign

 

 413.9  Angina Pectoris Other Unspec









 Office Visit  2013  8:00a  [facility]th[facility]  Alvarado Wilks,  924.11  
Contusion Knee



     Services Of  M.D.    



     C.M.A.      

 

 Office Visit  04/10/2013  8:30a  [facility]th[facility]  Alvarado Wilks,  728.89  Muscle 
Disorders



     Services Of  M.D.    Other



     C.M.A.      

 

 Office Visit  2012  9:45a  [facility]  Alvarado Wilks  728.89  Muscle 
Disorders



     Services Of  M.D.    Other



     C.M.A.      

 

 Office Visit  2012  8:00a  [facility]  Alvarado Wilks  719.46  Pain 
Joint Lower



     Services Of  M.D.    Leg



     C.M.A.      









 719.45  Pain Joint Pelvic Region & Thigh









 Office Visit  2012  9:00a  [facility]th[facility]  Alvarado Wilks  719.46  Pain 
Joint



     Services Of C.M.A.  M.D.    Lower Leg









 719.45  Pain Joint Pelvic Region & Thigh









 Office Visit  2012  9:00a  Orthopedic  Alvarado Wilks  V54.89  Aftercare
,



     Services Of  M.D.    Orthopedic Other



     C.M.A.      









 728.89  Muscle Disorders Other







Plan of Treatment

Future Appointment(s):2019  8:00 am - Traveling ECHO 2 at [facility]2019 - Jessie Sanders NBernardaPBernardaG47.33 Obstructive sleep 
apnea (adult) (pediatric)I48.0 Paroxysmal atrial fibrillationFollow up:f/u LS 6 
mo EKGRecommendations:IF you have increased frequency of afib let us know. 
Recommend regular moderate exercise 30-40min 4-5 days/weekI10 Essential (primary
) hypertensionComments:BP controlled.E78.2 Mixed hyperlipidemiaRecommendations:
Lipids at goal from labs from 20184861J43.2 Thoracic aortic aneurysm, without 
ruptureNew Orders:Echocardiogram, Scheduled: 19

## 2019-01-24 NOTE — ED
HPI Cardiac





- HPI Summary


HPI Summary: 


Pt is a 57 y/o M presenting to the ED with a chief complaint of palpitations 

onset around 2345. When he came into the ED he felt pretty normal but his chest 

is tight. Pt reports mild sob, mild lightheadedness, cough, and pain across his 

back under his shoulder blades. 








- History of Current Complaint


Chief Complaint: EDChestPainROMI


Stated Complaint: RAPID HR


Time Seen by Provider: 01/24/19 02:08


Hx Obtained From: Patient


Onset/Duration: Started Hours Ago, Still Present


Timing: Constant


Initial Severity: Mild


Current Severity: Mild


Pain Intensity: 3


Pain Scale Used: 0-10 Numeric


Chest Pain Location: Left Anterior


Chest Pain Radiates: Yes


Chest Pain Radiates To:: Back


Character: Heaviness, Irregular


Aggravating Factor(s): Nothing


Alleviating Factor(s): Rest


Associated Signs and Symptoms: Positive: Shortness of Breath, Lightheadedness, 

Palpitations, Cough, Back Pain





- Allergy/Home Medications


Allergies/Adverse Reactions: 


 Allergies











Allergy/AdvReac Type Severity Reaction Status Date / Time


 


gabapentin Allergy  Hallucinati Verified 01/24/19 02:01





   ons  














PMH/Surg Hx/FS Hx/Imm Hx


Previously Healthy: No


Endocrine/Hematology History: Reports: Hx Diabetes


   Denies: Hx Anticoagulant Therapy, Hx Anemia


Cardiovascular History: Reports: Hx Hypertension, Other Cardiovascular Problems/

Disorders - afib


Respiratory History: Reports: Hx Seasonal Allergies


GI History: 


   Denies: Hx Jaundice


Musculoskeletal History: Reports: Hx Orthopedic Injury - broken leg (2009)


Sensory History: Reports: Hx Contacts or Glasses


Opthamlomology History: Reports: Hx Contacts or Glasses





- Surgical History


Surgery Procedure, Year, and Place: orth, HIP AND FEMUR RECONSTRUCTION


Infectious Disease History: No


Infectious Disease History: 


   Denies: Traveled Outside the [place] in Last 30 Days





- Family History


Known Family History: 


   Negative: Cardiac Disease





- Social History


Lives: With Family


Alcohol Use: Occasionally


Substance Use Type: Reports: None


Smoking Status (MU): Never Smoked Tobacco


Have You Smoked in the Last Year: No





Review of Systems


Positive: Palpitations, Chest Pain


Positive: Shortness Of Breath


Positive: Myalgia - back pain


All Other Systems Reviewed And Are Negative: Yes





NIH Scale





- NIH Scale


Level of Consciousness: Alert/Keenly Responsive


Ask Patient the Month and His/Her Age: Both Correct


Ask Pt to Open/Close Eyes and /Release Non-Paretic Hand: Both Correctly


Best Gaze (Only Horizontal Eye Movement): Normal


Visual Field Testing: No Visual Loss


Facial Paresis-Pt to Smile & Close Eyes or Grimace Symmetry: Normal/Symmetrical


Motor Function - Right Arm: No Drift-Holds 10 Seconds


Motor Function - Left Arm: No Drift-Holds 10 Seconds


Motor Function - Right Leg: No Drift-Holds 10 Seconds


Motor Function - Left Leg: No Drift-Holds 10 Seconds


Limb Ataxia-Must be out of Proportion to Weakness Present: Absent


Sensory (Use Pinprick to Test Arms/Legs/Trunk/Face): Normal


Best Language (Describe Picture, Name Items): No Aphasia


Dysarthria (Read Several Words): Normal


Extinction and Inattention: No Abnormality


Total Score: 0





Physical Exam





- Summary


Physical Exam Summary: 





Appearance: Well appearing, no pain distress


Skin: warm, dry, reflects adequate perfusion


Head/face: normal


Eyes: EOMI, JORGE A


ENT: mucous membranes moist


Neck: supple, non-tender


Respiratory: CTA, breath sounds present


Cardiovascular: RRR, pulses symmetrical 


Abdomen: non-tender, soft


Bowel Sounds: present


Musculoskeletal: normal, strength/ROM intact


Neuro: normal, sensory motor intact, A&Ox3 





Triage Information Reviewed: Yes


Vital Signs On Initial Exam: 


 Initial Vitals











Temp Pulse Resp BP Pulse Ox


 


 97.5 F   62   16   124/78   96 


 


 01/24/19 01:58  01/24/19 01:58  01/24/19 01:58  01/24/19 01:58  01/24/19 01:58











Vital Signs Reviewed: Yes





Diagnostics





- Vital Signs


 Vital Signs











  Temp Pulse Resp BP Pulse Ox


 


 01/24/19 02:03  97.7 F    


 


 01/24/19 01:58  97.5 F  62  16  124/78  96














- Laboratory


Result Diagrams: 


 01/24/19 02:21





 01/24/19 02:21


Lab Statement: Any lab studies that have been ordered have been reviewed, and 

results considered in the medical decision making process.





- Radiology


  ** Chest x-ray


Radiology Interpretation Completed By: ED Physician


Summary of Radiographic Findings: No acute findings.  No mediastinal widening.  

Pending official radiology report.





- EKG


  ** 0151


Cardiac Rate: NL - 62bpm


EKG Rhythm: Sinus Rhythm


ST Segment: Non-Specific


EKG Comparison: No Significant Change - from 7/27/15


Summary of EKG Findings: AV block





Re-Evaluation





- Re-Evaluation


  ** 1


Re-Evaluation Time: 03:00


Change: Improved


Comment: Pt walked several laps around the ED briskly without any development 

of systems. A second troponin was suggested and the pt refused.





Disposition





- Course


Course Of Treatment: Nurse's notes reviewed.  Patient with history of atrial 

fibrillation presents complaining of mild chest discomfort and palpitations.  

Palpitations have since resolved and he has minimalist symptoms now.  He was 

given a small dose of beta blocker here and had no recurrence of symptoms.  His 

troponin is 0 and remainder of testing including EKG are normal.  The patient 

was walked briskly in the ER and failed to have any symptoms.  He does have 

outpatient echocardiogram as ordered by his cardiologist on Friday.  Patient 

refused second troponin would like to discharge with close follow-up to be made 

with a call to his cardiologist/primary care physician first thing in the 

morning.  No medication adjustments at this time.





- Differential Dx - Cardiopulmonary


Differential Diagnoses - Cardiopulmonary: Other - Cardiac ischemia/ACS, atrial 

fibrillation, ectopy, SVT, V. tach





- Diagnoses


Provider Diagnoses: 


 Palpitations, History of atrial fibrillation








Discharge





- Sign-Out/Discharge


Documenting (check all that apply): Patient Departure





- Discharge Plan


Condition: Improved


Disposition: HOME


Patient Education Materials:  Heart Palpitations (ED)


Referrals: 


Deuce Quinones MD [Primary Care Provider] - 


Rosa Painting MD [Family Provider] - 


Additional Instructions: 


Call your cardiologist first thing in the morning to schedule prompt follow-up.

  You may need medication adjustments or additional testing.  Additional 

testing could include placement of a Holter or event monitor, stress test in 

addition to your scheduled echocardiogram on Friday.  No strenuous exercise.  

Take all medications as prescribed.  Return if worse, chest pain, difficulty 

breathing, new symptoms or other concerns.





- Billing Disposition and Condition


Condition: IMPROVED


Disposition: Home





- Attestation Statements


Document Initiated by Scribe: Yes


Documenting Scribe: Karie Siddiqi


Provider For Whom [place] is Documenting (Include Credential): Adam Alas MD.


Scribe Attestation: 


Karie WALTON, scribed for Adam Alas MD. on 01/24/19 at 0409. 


Scribe Documentation Reviewed: Yes


Provider Attestation: 


The documentation as recorded by the scribe, Karie Siddiqi accurately 

reflects the service I personally performed and the decisions made by me, Adam Alas MD.


Status of Francoisibe Document: Viewed

## 2019-01-24 NOTE — XMS REPORT
Continuity of Care Document (CCD)

 Created on:2019



Patient:Ameya Palomo

Sex:Male

:1960

External Reference #:[id number]





Demographics







 Address  [address]



   [place] [address]

 

 Home Phone  [phone]

 

 Mobile Phone  [phone]

 

 Email Address  [email or web address]

 

 Preferred Language  en

 

 Marital Status  Not  or 

 

 Church Affiliation  Unknown

 

 Race  White

 

 Ethnic Group  Not  or 









Author







 Name  Lyn Ross









Support







 Name  Relationship  Address  Phone

 

 Brittney Palomo  Wife  [address]  [phone]



     [place] [address]  









Care Team Providers







 Name  Role  Phone

 

 Deuce Quinones MD  Primary Care Physician  Unavailable









Payers







 Type  Date  Identification Numbers  Payment Provider  Subscriber

 

     Policy Number: [id number]  [employer]  Ameya Palomo









 PayID: [id number]  [address] [address]









 [place] [address]







Advance Directives







 Description

 

 No Information Available







Problems







 Date  Description  Provider  Status

 

 Onset: 2014  Atrial fibrillation  Rosa Painting M.D.  Active

 

 Onset: 2014  Aneurysm of thoracic aorta  Rosa Painting M.D.  Active

 

 Onset: 2014  Mixed hyperlipidemia  Rosa Painting M.D.  Active

 

 Onset: 2014  Benign essential hypertension  Rosa Painting M.D.  Active

 

 Onset: 2014  Angina pectoris  Rosa Painting M.D.  Active

 

 Onset: 10/30/2014  Hypersomnia with sleep apnea  Zak Gordon M.D.  Active

 

 Onset: 2015  Obstructive sleep apnea syndrome  Zak Gordon M.D.  
Active

 

 Onset: 2015  Atrial flutter  Rosa Painting M.D.  Active

 

 Onset: 2015  Essential hypertension  Rosa Painting M.D.  Active

 

 Onset: 2017  Paroxysmal atrial fibrillation  Rosa Painting M.D.  Active







Family History







 Date  Family Member(s)  Problem(s)  Comments

 

   Father  Lung Cancer  

 

   Father  ALS  

 

   Father  Chronic Obstructive Pulmonary Disease (COPD)  

 

   Mother  Diabetes  

 

   Mother  Cerebrovascular Accident (CVA)  

 

   Mother  Rheumatiod arthritis  







Social History







 Type  Date  Description  Comments

 

 Birth Sex    Unknown  

 

 Marital Status      

 

 Lives With    Wife  

 

 Lives With    Children  3 children

 

 Occupation    Currently Working  computer support

 

 Tobacco Use  Start: Unknown  Never Smoked Cigarettes  

 

 Smoking Status  Reviewed: 19  Never Smoked Cigarettes  

 

 ETOH Use    Rarely consumes beer  

 

 Tobacco Use  Start: Unknown  Patient has never smoked  

 

 Recreational Drug Use    Denies Drug Use  

 

 Exercise Type/Frequency    Does not exercise  







Allergies, Adverse Reactions, Alerts







 Date  Description  Reaction  Status  Severity  Comments

 

 2014  Gabapentin  hallucinations  Active  Severe  per [facility] chart

 

 2014  Metoprolol    Active    Per Dr. Allison see paper chart

 

 2013  NKDA    Inactive    

 

 2014  NKDA    Inactive    







Medications







 Medication  Date  Status  Form  Strength  Qnty  SIG  Indications  Ordering



                 Provider

 

 Metformin HCL    Active  Tablets  500mg    1 by    Unknown



   016          mouth    



             once a    



             day in    



             the a.m.    

 

 Propafenone    Active  Tablets  150mg  270tab  1 tablet  I48.0  Rosa



 HCL  014        s  by mouth    Garima,



             three    M.D.



             times a    



             day    

 

 Potassium    Active  Capsules ER  10Meq  180cap  2 tablets    Rosa



 Chloride ER  014        s  by mouth    Viola,



             every day    M.D.

 

 Benazepril HCL    Active  Tablets  5mg  90tabs  1 tab by  I10  Rosa



   014          mouth    Viola,



             every day    M.D.



             hs.    

 

 Cardizem CD    Active  Caps ER  120mg  180cap  1 by  I48.0  Rosa



   014    24HR    s  mouth    Garima,



             twice a    M.D.



             day    

 

 Pravachol    Active  Tablets  40mg  90tabs  1 tablet    Unknown



   000          by mouth    



             every    



             night at    



             bedtime    

 

 Aspirin    Active  Tablets  81mg    1 by    Unknown



   000          mouth    



             every day    

 

 Aleve    Active  Capsules  220mg  60caps  1 by    Unknown



   000          mouth    



             twice a    



             day as    



             needed    

 

 Cpap    Active  Device          Unknown



   000              

 

                 

 

 Propafenone    Hx  Caps ER  225mg  180cap  1 by  427.31  Rosa



 HCL ER  014 -    12HR    s  mouth    Viola,



             twice a    M.D.



   015          day    

 

 Multaq    Hx  Tablets  400mg  180tab  1 by    Rosa



   014 -        s  mouth    Garima,



             twice a    M.D.



   014          day    

 

 Flexeril    Hx  Tablets  10mg  60tabs  1 po tid    Alvarado



   012 -          zaidkarla Wilks,



   05/10/2              M.D.



   016              

 

 Terazosin HCL  0  Hx  Capsules  1mg  270cap  1 po    Unknown



   000 -        s  qhs(pt    



             stopped    



   017          taking 1    



             month    



             ago)    

 

 Benazepril HCL  0  Hx  Tablets  20mg  30tabs  by mouth  427.31  Unknown



   000 -          every day    



                 



   014              

 

 Diazepam  0  Hx    120mg    1 twice    Unknown



   000 -          as day    



                 



   016              

 

 Oxycodone HCL  0  Hx        rarely    Unknown



   000 -          prn for    



   05/10/2          leg    



   016          cramps    







Immunizations







 Description

 

 No Information Available







Vital Signs







 Date  Vital  Result  Comment

 

 2019  8:56am  Height  72 inches  6'0"









 Weight  213.00 lb  

 

 Heart Rate  56 /min  

 

 BP Systolic Sitting  120 mmHg  Lue large cuff

 

 BP Diastolic Sitting  80 mmHg  Lue large cuff

 

 Respiratory Rate  16 /min  

 

 O2 % BldC Oximetry  98 %  On Ra

 

 BMI (Body Mass Index)  28.9 kg/m2  









 2018  9:35am  Height  72 inches  6'0"









 Weight  206.00 lb  with shoes

 

 Heart Rate  60 /min  

 

 BP Systolic Sitting  128 mmHg  Rue reg cuff

 

 BP Diastolic Sitting  70 mmHg  Rue reg cuff

 

 BP Systolic Standing  122 mmHg  Rue reg cuff

 

 BP Diastolic Standing  78 mmHg  Rue reg cuff

 

 Respiratory Rate  16 /min  

 

 BMI (Body Mass Index)  27.9 kg/m2  

 

 Ejection Fraction  55-60%  as of 17 echo









 12/15/2017  1:56pm  Height  72 inches  6'0"









 Weight  215.00 lb  with shoes

 

 Heart Rate  64 /min  

 

 BP Systolic Sitting  120 mmHg  Rue lg cuff

 

 BP Diastolic Sitting  70 mmHg  Rue lg cuff

 

 BP Systolic Standing  120 mmHg  Rue lg cuff

 

 BP Diastolic Standing  72 mmHg  Rue lg cuff

 

 Respiratory Rate  17 /min  

 

 BMI (Body Mass Index)  29.2 kg/m2  









 2017  8:53am  Height  72 inches  6'0"









 Weight  208.00 lb  without shoes

 

 Heart Rate  62 /min  

 

 BP Systolic Sitting  132 mmHg  Rue reg cuff

 

 BP Diastolic Sitting  80 mmHg  Rue reg cuff

 

 BP Systolic Standing  124 mmHg  Rue reg cuff

 

 BP Diastolic Standing  80 mmHg  Rue reg cuff

 

 Respiratory Rate  16 /min  

 

 BMI (Body Mass Index)  28.2 kg/m2  

 

 Ejection Fraction  50-55%  date 2016 ECHO









 2017  9:47am  Height  70 inches  5'10"









 Heart Rate  61 /min  

 

 BP Systolic Sitting  116 mmHg  

 

 BP Diastolic Sitting  70 mmHg  

 

 Respiratory Rate  14 /min  

 

 Pain Level  0  

 

 O2 % BldC Oximetry  96 %  









 2016  8:13am  Height  70 inches  5'10"









 Weight  211.00 lb  No shoes

 

 Heart Rate  58 /min  

 

 BP Systolic Sitting  118 mmHg  Rue lrg cuff

 

 BP Diastolic Sitting  72 mmHg  Rue lrg cuff

 

 BP Systolic Standing  110 mmHg  Rue lrg cuff

 

 BP Diastolic Standing  74 mmHg  Rue lrg cuff

 

 Respiratory Rate  15 /min  

 

 BMI (Body Mass Index)  30.3 kg/m2  

 

 Ejection Fraction  50-55%  2016









 06/10/2016  8:07am  Height  70 inches  5'10"









 Weight  215.00 lb  w/ shoes

 

 Heart Rate  58 /min  reg

 

 BP Systolic Sitting  110 mmHg  Rue, lg cuff

 

 BP Diastolic Sitting  72 mmHg  Rue, lg cuff

 

 BP Systolic Standing  104 mmHg  Rue

 

 BP Diastolic Standing  70 mmHg  Rue

 

 Respiratory Rate  16 /min  

 

 BMI (Body Mass Index)  30.8 kg/m2  

 

 Ejection Fraction  50-55%  as of 16 echo









 2016  8:52am  Height  70 inches  5'10"









 Weight  213.00 lb  

 

 Heart Rate  61 /min  

 

 BP Systolic Sitting  130 mmHg  

 

 BP Diastolic Sitting  76 mmHg  

 

 Respiratory Rate  18 /min  

 

 O2 % BldC Oximetry  98 %  

 

 BMI (Body Mass Index)  30.6 kg/m2  









 2015 12:07pm  Height  70 inches  5'10"









 Weight  213.00 lb  with boots

 

 Heart Rate  80 /min  

 

 BP Systolic Sitting  128 mmHg  Ra reg cuff

 

 BP Diastolic Sitting  80 mmHg  Ra reg cuff

 

 BP Systolic Standing  128 mmHg  Ra reg cuff

 

 BP Diastolic Standing  82 mmHg  Ra reg cuff

 

 Respiratory Rate  17 /min  

 

 BMI (Body Mass Index)  30.6 kg/m2  

 

 Ejection Fraction  50%  date 14 ECHO









 2015  8:44am  Height  70 inches  5'10"









 Heart Rate  58 /min  

 

 BP Systolic  122 mmHg  

 

 BP Diastolic  82 mmHg  

 

 Respiratory Rate  14 /min  

 

 O2 % BldC Oximetry  98 %  









 2015 10:22am  Height  70 inches  5'10"









 Weight  213.50 lb  

 

 Heart Rate  62 /min  reg

 

 BP Systolic Sitting  124 mmHg  Ra, reg cuff

 

 BP Diastolic Sitting  90 mmHg  Ra, reg cuff

 

 BP Systolic Standing  124 mmHg  Ra

 

 BP Diastolic Standing  90 mmHg  Ra

 

 Respiratory Rate  16 /min  

 

 BMI (Body Mass Index)  30.6 kg/m2  

 

 Ejection Fraction  50%  14  9:00am  Heart Rate  56 /min  









 BP Systolic Sitting  142 mmHg  

 

 BP Diastolic Sitting  79 mmHg  

 

 Respiratory Rate  20 /min  

 

 O2 % BldC Oximetry  97 %  









 2015 10:04am  Height  70 inches  5'10"









 Weight  222.00 lb  with shoes

 

 Heart Rate  70 /min  

 

 BP Systolic Sitting  140 mmHg  LA, Lg cuff

 

 BP Diastolic Sitting  96 mmHg  LA, Lg cuff

 

 BP Systolic Standing  134 mmHg  LA

 

 BP Diastolic Standing  92 mmHg  LA

 

 Respiratory Rate  16 /min  

 

 BMI (Body Mass Index)  31.9 kg/m2  









 2015  2:45pm  Height  70 inches  5'10"









 Weight  225.00 lb  

 

 Heart Rate  66 /min  

 

 BP Systolic Sitting  136 mmHg  

 

 BP Diastolic Sitting  70 mmHg  

 

 Respiratory Rate  20 /min  

 

 O2 % BldC Oximetry  98 %  

 

 BMI (Body Mass Index)  32.3 kg/m2  









 2015  8:38am  Height  72 inches  6'0"









 Weight  224.00 lb  with shoes

 

 Heart Rate  66 /min  regular

 

 BP Systolic Sitting  122 mmHg  right arm reg cuff

 

 BP Diastolic Sitting  86 mmHg  right arm reg cuff

 

 BP Systolic Standing  120 mmHg  right arm reg cuff

 

 BP Diastolic Standing  88 mmHg  right arm reg cuff

 

 Respiratory Rate  18 /min  

 

 BMI (Body Mass Index)  30.4 kg/m2  









 2014  7:55am  Height  72 inches  6'0"









 Weight  217.00 lb  no shoes

 

 Heart Rate  72 /min  

 

 BP Systolic Sitting  120 mmHg  Ra, reg cuff

 

 BP Diastolic Sitting  80 mmHg  Ra, reg cuff

 

 BP Systolic Standing  126 mmHg  

 

 BP Diastolic Standing  80 mmHg  

 

 Respiratory Rate  16 /min  

 

 BMI (Body Mass Index)  29.4 kg/m2  









 10/30/2014  8:23am  Height  72 inches  6'0"









 Weight  215.00 lb  

 

 Heart Rate  66 /min  

 

 BP Systolic Sitting  114 mmHg  left arm, large cuff

 

 BP Diastolic Sitting  82 mmHg  left arm, large cuff

 

 Respiratory Rate  16 /min  

 

 O2 % BldC Oximetry  97 %  Room air

 

 BMI (Body Mass Index)  29.2 kg/m2  

 

 Neck Circumference in inches  17.25  









 2014  8:28am  Height  72 inches  6'0"









 Weight  218.00 lb  without shoes

 

 Heart Rate  68 /min  

 

 BP Systolic Sitting  130 mmHg  L arm reg cuff

 

 BP Diastolic Sitting  82 mmHg  L arm reg cuff

 

 BP Systolic Standing  130 mmHg  

 

 BP Diastolic Standing  76 mmHg  

 

 Respiratory Rate  18 /min  

 

 BMI (Body Mass Index)  29.6 kg/m2  









 2014  8:40am  Height  72 inches  6'0"









 Weight  219.00 lb  without shoes

 

 Heart Rate  60 /min  

 

 BP Systolic Sitting  124 mmHg  LA lg cuff

 

 BP Diastolic Sitting  86 mmHg  LA lg cuff

 

 BP Systolic Lying Down  122 mmHg  LA lg cuff Stand

 

 BP Diastolic Lying Down  86 mmHg  LA lg cuff Stand

 

 BMI (Body Mass Index)  29.7 kg/m2  









 2014  8:13am  Height  72 inches  6'0"









 Weight  219.50 lb  With shoes

 

 Heart Rate  76 /min  

 

 BP Systolic Sitting  110 mmHg  Ra, Reg cuff

 

 BP Diastolic Sitting  78 mmHg  Ra, Reg cuff

 

 BP Systolic Standing  112 mmHg  

 

 BP Diastolic Standing  80 mmHg  

 

 BMI (Body Mass Index)  29.8 kg/m2  









 2014  9:42am  Height  72 inches  6'0"









 Weight  219.00 lb  without shoes

 

 Heart Rate  70 /min  

 

 BP Systolic Sitting  130 mmHg  Ra reg cuff

 

 BP Diastolic Sitting  70 mmHg  Ra reg cuff

 

 BP Systolic Standing  122 mmHg  Ra reg cuff

 

 BP Diastolic Standing  70 mmHg  Ra reg cuff

 

 Respiratory Rate  17 /min  

 

 BMI (Body Mass Index)  29.7 kg/m2  









 2014  7:55am  Height  72 inches  6'0"









 Weight  225.00 lb  with shoes

 

 Heart Rate  70 /min  

 

 BP Systolic Sitting  138 mmHg  Ra reg cuff

 

 BP Diastolic Sitting  80 mmHg  Ra reg cuff

 

 BP Systolic Standing  140 mmHg  Ra reg cuff

 

 BP Diastolic Standing  80 mmHg  Ra reg cuff

 

 Respiratory Rate  17 /min  

 

 BMI (Body Mass Index)  30.5 kg/m2  









 2014  8:14am  Height  72 inches  6'0"









 Weight  217.00 lb  

 

 Heart Rate  68 /min  

 

 BP Systolic Sitting  102 mmHg  Ra large cuff

 

 BP Diastolic Sitting  68 mmHg  Ra large cuff

 

 BP Systolic Standing  108 mmHg  Ra

 

 BP Diastolic Standing  72 mmHg  Ra

 

 Respiratory Rate  16 /min  

 

 BMI (Body Mass Index)  29.4 kg/m2  









 2013  8:30am  Height  72 inches  6'0"









 Weight  215.00 lb  

 

 Heart Rate  62 /min  

 

 BP Systolic  123 mmHg  

 

 BP Diastolic  78 mmHg  

 

 BMI (Body Mass Index)  29.2 kg/m2  







Results







 Test  Date  Facility  Test  Result  H/L  Range  Note

 

 Laboratory test  2018  [facility]  Creatine  112 U/L  N  10-
223  



 finding    [address]  Kinase(CK)        



     [place] [address] [phone]          

 

 Comp Metabolic  2018  [facility]  Sodium  139 mmol/L  N  135-
145  



 Panel    [address]          



     [place] [address] [phone]          









 Potassium  4.3 mmol/L  N  3.5-5.0  

 

 Chloride  106 mmol/L  N  101-111  

 

 Co2 Carbon Dioxide  27 mmol/L  N  22-32  

 

 Anion Gap  6 mmol/L  N  2-11  

 

 Glucose  157 mg/dL  High    

 

 Blood Urea Nitrogen  13 mg/dL  N  6-24  

 

 Creatinine  1.06 mg/dL  N  0.67-1.17  

 

 BUN/Creatinine Ratio  12.3  N  8-20  

 

 Calcium  9.6 mg/dL  N  8.6-10.3  

 

 Total Protein  6.8 g/dL  N  6.4-8.9  

 

 Albumin  4.8 g/dL  N  3.2-5.2  

 

 Globulin  2.0 g/dL  N  2-4  

 

 Albumin/Globulin Ratio  2.4  N  1-3  

 

 Total Bilirubin  1.00 mg/dL  N  0.2-1.0  

 

 Alkaline Phosphatase  53 U/L  N    

 

 Alt  21 U/L  N  7-52  

 

 Ast  16 U/L  N  13-39  

 

 Egfr Non-  71.5    >60  

 

 Egfr   86.5    >60  1









 Lipid Profile  2018  [facility]  Triglycerides  86 mg/dL      
2



 (Trig/Chol/HDL)    [address]  [place] [address] [phone]          









 Cholesterol  134 mg/dL      3

 

 HDL Cholesterol  36.3 mg/dL      4

 

 LDL Cholesterol  81 mg/dL      5









 Lipid Panel - [facility]  2018  [facility]  Creatine  <pending>      



     [address]  [facility]  Kinase(CK)        



     [place] [address] [phone]          

 

 Laboratory test  12/15/2017  [facility]  Ast (Sgot)  <pending>      



 finding    [address]          



     [place] [address] [phone]          

 

 Lipid Panel - [facility]  2016  [facility]  Creatine  68 U/L  N  10-
223  



     [address]  [facility]  Kinase(CK)        



     [place] [address] [phone]          

 

 Comp Metabolic  2016  [facility]  Sodium  137 mmol/L  N  133-
14  



 Panel    [address]        [address] [place] [id number] [phone]          









 Potassium  4.5 mmol/L  N  3.5-5.0  

 

 Chloride  104 mmol/L  N  101-111  

 

 Co2 Carbon Dioxide  29 mmol/L  N  22-32  

 

 Anion Gap  4 mmol/L  N  2-11  

 

 Glucose  146 mg/dL  High    

 

 Blood Urea Nitrogen  16 mg/dL  N  6-24  

 

 Creatinine  1.14 mg/dL  N  0.67-1.17  

 

 BUN/Creatinine Ratio  14.0  N  8-20  

 

 Calcium  9.5 mg/dL  N  8.6-10.3  

 

 Total Protein  6.6 g/dL  N  6.4-8.9  

 

 Albumin  4.3 g/dL  N  3.2-5.2  

 

 Globulin  2.3 g/dL  N  2-4  

 

 Albumin/Globulin Ratio  1.9  N  1-3  

 

 Total Bilirubin  0.80 mg/dL  N  0.2-1.0  

 

 Alkaline Phosphatase  49 U/L  N    

 

 Alt  21 U/L  N  7-52  

 

 Ast  15 U/L  N  13-39  

 

 Egfr Non-  66.0  N  >60  

 

 Egfr   84.9  N  >60  6









 Lipid Profile  2016  [facility]  Triglycerides  84 mg/dL  N  
  7



 (Trig/Chol/HDL)    [address] [place], [place] [id number] [phone]          









 Cholesterol  140 mg/dL  N    8

 

 HDL Cholesterol  35.4 mg/dL  N    9

 

 LDL Cholesterol  88 mg/dL  N    10









 Inr/Protime  2014  [facility]  Inr  0.99  N  0.85-1.06  



     [address]          



     [place], [place] [id number] [phone]          

 

 Laboratory test  2014  [facility]  B Type  21 pg/mL  N    11



 finding    [address]  Natriuretic        



     [place] [address]  Peptide        



     [phone]          

 

 Comp Metabolic  2014  [facility]  Sodium  137  N  133-145  



 Panel    [address]    mmol/L      



     [place], [place] [id number] [phone]          









 Potassium  4.0 mmol/L  N  3.7-5.6  

 

 Chloride  105 mmol/L  N  101-111  

 

 Co2 Carbon Dioxide  25 mmol/L  N  22-32  

 

 Anion Gap  7 mmol/L  N  2-11  

 

 Glucose  109 mg/dL  High    

 

 Blood Urea Nitrogen  17 mg/dL  N  6-24  

 

 Creatinine  1.18 mg/dL  High  0.67-1.17  

 

 BUN/Creatinine Ratio  14.4  N  8-20  

 

 Calcium  9.4 mg/dL  N  8.6-10.3  

 

 Total Protein  6.6 g/dL  N  6.4-8.9  

 

 Albumin  4.7 g/dL  N  3.2-5.2  

 

 Globulin  1.9 g/dL  Low  2-4  

 

 Albumin/Globulin Ratio  2.5  N  1-3  

 

 Total Bilirubin  0.90 mg/dL  N  0.2-1.0  

 

 Alkaline Phosphatase  39 U/L  N    

 

 Alt  26 U/L  N  7-52  

 

 Ast  18 U/L  N  13-39  

 

 Egfr Non-  64.1  N  >60  

 

 Egfr   82.4  N  >60  12









 Laboratory test  2014  [facility]  Magnesium  2.1 mg/dL  N  
1.9-2.7  



 finding    [address]          



     [place] [address] [phone]          









 Creatine Kinase  207 U/L  N    









 CKMB  2014  [facility]  CKMB  ng/mL  5.5 ng/mL  N  0.6-6.3  



     [address]          



     [place] [address] [phone]          

 

 CBC Auto Diff  2014  [facility]  White Blood  7.9 10^3/uL  N  
4.8-10.8  



     [address]  Count        



     [place] [address] [phone]          









 Red Blood Count  5.59 10^6/uL  High  4.0-5.4  

 

 Hemoglobin  16.9 g/dL  N  14.0-18.0  

 

 Hematocrit  48 %  N  42-52  

 

 Mean Corpuscular Volume  86 fL  N  80-94  

 

 Mean Corpuscular Hemoglobin  30 pg  N  27-31  

 

 Mean Corpuscular HGB Conc  35 g/dL  N  31-36  

 

 Red Cell Distribution Width  13 %  N  10.5-15  

 

 Platelet Count  191 10^3/uL  N  150-450  

 

 Mean Platelet Volume  10 um3  N  7.4-10.4  

 

 Abs Neutrophils  4.9 10^3/uL  N  1.5-7.7  

 

 Abs Lymphocytes  1.7 10^3/uL  N  1.0-4.8  

 

 Abs Monocytes  1.0 10^3/uL  High  0-0.8  

 

 Abs Eosinophils  0.2 10^3/uL  N  0-0.6  

 

 Abs Basophils  0.1 10^3/uL  N  0-0.2  

 

 Abs Nucleated RBC  0.01 10^3/uL  N    

 

 Granulocyte %  61.2 %  N  38-83  

 

 Lymphocyte %  21.8 %  Low  25-47  

 

 Monocyte %  13.2 %  High  1-9  

 

 Eosinophil %  2.5 %  N  0-6  

 

 Basophil %  1.3 %  N  0-2  

 

 Nucleated Red Blood Cells %  0.1  N    









 Laboratory test  2014  [facility]  Troponin I  0.01 ng/mL  N  
<0.03  13



 finding    [address]          



     [place] [address] [phone]          









 TSH (Thyroid Stimulating Horm)  3.40 IU/mL  N  0.34-5.60  









 1  *******Because ethnic data is not always readily available,



   this report includes an eGFR for both -Americans and



   non- Americans.****



   The National Kidney Disease Education Program (NKDEP) does



   not endorse the use of the MDRD equation for patients that



   are not between the ages of 18 and 70, are pregnant, have



   extremes of body size, muscle mass, or nutritional status,



   or are non- or non-.



   According to the National Kidney Foundation, irrespective of



   diagnosis, the stage of the disease is based on the level of



   kidney function:



   Stage Description                      GFR(mL/min/1.73 m(2))



   1     Kidney damage with normal or decreased GFR       90



   2     Kidney damage with mild decrease in GFR          60-89



   3     Moderate decrease in GFR                         30-59



   4     Severe decrease in GFR                           15-29



   5     Kidney failure                       <15 (or dialysis)

 

 2  Desirable: <150



   Borderline High: 150-199



   High: 200-499



   Very High: >500

 

 3  Desirable: <200



   Borderline High: 200-239



   High: >239

 

 4  Low: <40



   Desirable: 40-60



   High: >60

 

 5  Desirable: <100



   Near Optimal: 100-129



   Borderline High: 130-159



   High: 160-189



   Very High: >189

 

 6  *******Because ethnic data is not always readily available,



   this report includes an eGFR for both -Americans and



   non- Americans.****



   The National Kidney Disease Education Program (NKDEP) does



   not endorse the use of the MDRD equation for patients that



   are not between the ages of 18 and 70, are pregnant, have



   extremes of body size, muscle mass, or nutritional status,



   or are non- or non-.



   According to the National Kidney Foundation, irrespective of



   diagnosis, the stage of the disease is based on the level of



   kidney function:



   Stage Description                      GFR(mL/min/1.73 m(2))



   1     Kidney damage with normal or decreased GFR       90



   2     Kidney damage with mild decrease in GFR          60-89



   3     Moderate decrease in GFR                         30-59



   4     Severe decrease in GFR                           15-29



   5     Kidney failure                       <15 (or dialysis)

 

 7  Desirable <150



   Borderline high 150-199



   High 200-499



   Very High >500

 

 8  Desirable <200



   Borderline high 200-239



   High >239

 

 9  Low <40



   Desirable: 40-60



   High: >60

 

 10  Desirable: <100 mg/dL



   Near Optimal: 100-129 mg/dL



   Borderline High: 130-159 mg/dL



   High: 160-189 mg/dL



   Very High: >189 mg/dL

 

 11  >100 to <200 pg/mL: likely compensated congestive heart



   failure (CHF)



   200 to 400 pg/mL: likely moderate CHF



   >400 pg/mL: likely moderate to severe CHF



   [facility]

 

 12  *******Because ethnic data is not always readily available,



   this report includes an eGFR for both -Americans and



   non- Americans.****



   The National Kidney Disease Education Program (NKDEP) does



   not endorse the use of the MDRD equation for patients that



   are not between the ages of 18 and 70, are pregnant, have



   extremes of body size, muscle mass, or nutritional status,



   or are non- or non-.



   According to the National Kidney Foundation, irrespective of



   diagnosis, the stage of the disease is based on the level of



   kidney function:



   Stage Description                      GFR(mL/min/1.73 m(2))



   1     Kidney damage with normal or decreased GFR       90



   2     Kidney damage with mild decrease in GFR          60-89



   3     Moderate decrease in GFR                         30-59



   4     Severe decrease in GFR                           15-29



   5     Kidney failure                       <15 (or dialysis)

 

 13  Reference Range and Interpretation:



   TnI (ng/mL)             Interpretation



   Less Than 0.03 ng/mL    Not supportive of diagnosis of MI



   0.03 - 0.50 ng/mL       Indeterminate: suggest serial



   studies if clinically indicated.



   Greater than 0.5 ng/mL  Consistent with diagnosis of MI







Procedures







 Date  Code  Description  Status

 

 2018  [address]148  EKG Tracing & Interpretation  Completed

 

 12/15/2017  02420  EKG Tracing & Interpretation  Completed

 

 2017  81836  EKG Tracing & Interpretation  Completed

 

 2017  41116  ECHO Transthoracic, Real-Time 2D With Doppler And Color  
Completed



     Flow  

 

 2016  71678  EKG Tracing & Interpretation  Completed

 

 06/10/2016  35582  EKG Tracing & Interpretation  Completed

 

 2016  17256  ECHO Transthoracic, Real-Time 2D With Doppler And Color  
Completed



     Flow  

 

 2015  33527  EKG Tracing & Interpretation  Completed

 

 2015  81751  EKG Tracing & Interpretation  Completed

 

 2014  34421  Polysomnography Sleep Staging 4+ Parameters  Completed

 

 2014  40773  EKG Tracing & Interpretation  Completed

 

 2014  67388  EKG Tracing & Interpretation  Completed

 

 2014  32514  EKG Tracing & Interpretation  Completed

 

 2014  24407  EKG Tracing & Interpretation  Completed

 

 2014  48061  EKG Tracing & Interpretation  Completed

 

 2014  92494  Cardiac Event Monitor  Completed

 

 2014  66307  Stress Test  Completed

 

 2014  39422  EKG Tracing & Interpretation  Completed

 

 2014  25869  ECHO Transthoracic, Real-Time 2D With Doppler And Color  
Completed



     Flow  

 

 2014  62670  EKG Tracing & Interpretation  Completed

 

 2013  33265  Xray Knee 3 Views  Completed

 

 2013  50637  ECHO Transthoracic, Real-Time 2D With Doppler And Color  
Completed



     Flow  

 

 2012  62021  Xray Knee 3 Views  Completed

 

 2012  31976  Rad Exam; Knee, Ap&L  Completed

 

 2012  99634  Rad Exam; Femur  Completed







Encounters







 Type  Date  Location  Provider  Dx  Diagnosis

 

 Office Visit  2018  [facility]  Rosa Painting,  I48.0  Paroxysmal 
atrial



   9:45a  Of [facility]  M.D.    fibrillation









 I10  Essential (primary) hypertension

 

 E78.2  Mixed hyperlipidemia

 

 I71.2  Thoracic aortic aneurysm, without rupture









 Office Visit  12/15/2017  1:45p  [facility]  Rosa Painting  I48.0  
Paroxysmal atrial



     Of [facility]  M.D.    fibrillation









 I10  Essential (primary) hypertension

 

 E78.2  Mixed hyperlipidemia

 

 E11.8  Type 2 diabetes mellitus with unspecified complications









 Office Visit  2017  9:00a  [facility]  Rosa Painting  I48.0  
Paroxysmal atrial



     Of [facility]  M.D.    fibrillation









 I10  Essential (primary) hypertension

 

 Z82.49  Family hx of ischem heart dis and oth dis of the circ sys









 Office Visit  2017  [facility]  Lesley  G47.33  Obstructive sleep



   9:30a  [facility]  ZACK Avila, RN,    apnea (adult)



     [facility]  FNP-BC    (pediatric)

 

 Office Visit  2016  [facility]  PK Verduzco  I48.0  Paroxysmal 
atrial



   8:30a  Of [facility]      fibrillation









 I10  Essential (primary) hypertension

 

 I71.2  Thoracic aortic aneurysm, without rupture

 

 E78.2  Mixed hyperlipidemia









 Office Visit  06/10/2016  8:20a  [facility]  Rosa Painting,  I71.2  
Thoracic aortic



     Of [facility] AT [facility]  M.DBernarda    aneurysm,



           without rupture









 I48.0  Paroxysmal atrial fibrillation

 

 I10  Essential (primary) hypertension









 Office Visit  2016  8:45a  [facility]  Zak SK.  G47.33  
Obstructive sleep



     [facility]  KERRY Gordon    apnea (adult)



     [facility]      (pediatric)

 

 Office Visit  2015 11:45a  [facility]  Rosa  I48.0  Paroxysmal 
atrial



     Of [facility]  Viola,    fibrillation



       M.DBernarda    









 I10  Essential (primary) hypertension

 

 E78.2  Mixed hyperlipidemia









 Office Visit  2015  [facility]  Zak SK.  327.23  
Obstructive Sleep



   8:15a  Services Of [facility]  KERRY Gordon    Apnea Adult &



           Pediatric

 

 Office Visit  2015  Neurohospitalist  Ketan  780.4  Dizziness &



   2:37p  Clinic  Vianca Dockery M.D.    

 

 Office Visit  2015  [facility] Nicki Lee  427.31  Atrial



   10:15a  [facility]  Viola,    Fibrillation



       M.DBernarda    









 401.1  Hypertension Benign

 

 272.2  Hyperlipidemia Mixed









 Office Visit  2015  8:45a  [facility]  Zak SK.  327.23  
Obstructive Sleep



     [facility]  KERRY Gordon    Apnea Adult &



     [facility]      Pediatric

 

 Office Visit  2015  9:45a  [facility]  Rosa  427.31  Atrial



     Of [facility]  Viola,    Fibrillation



       M.DBernarda    









 427.32  Atrial Flutter









 Office Visit  2015  2:45p  [facility]  Zak SK.  327.23  
Obstructive Sleep



     [facility]  KERRY Gordon    Apnea Adult &



     [facility]      Pediatric

 

 Office Visit  2015  8:30a  [facility]  Sarah Sellers,  327.23  
Obstructive Sleep



     Of [facility]  [place]    Apnea Adult &



           Pediatric









 427.31  Atrial Fibrillation

 

 401.1  Hypertension Benign

 

 441.2  Aneurysm Thoracic W/O Rupture









 Office Visit  2014  7:45a  [facility]  Rosa Painting  427.31  Atrial



     Cardiology Of  M.D.    Fibrillation



     [facility]      









 401.1  Hypertension Benign









 Office Visit  10/30/2014  8:30a  [facility]  Zak SK.  780.53  
Hypersomnia W/



     [facility]  KERRY Gordon    Sleep Apnea



     [facility]      Unspecified

 

 Office Visit  2014  8:15a  [facility]  Rosa  427.31  Atrial



     Of [facility]  Garima,    Fibrillation



       M.D.    

 

 Office Visit  2014  8:30a  [facility]  Sarah Sellers,  401.1  
Hypertension



     Of [facility]  [place]    Benign









 427.31  Atrial Fibrillation

 

 272.2  Hyperlipidemia Mixed









 Office Visit  2014  8:00a  [facility]  Rosa Painting,  427.31  Atrial



     Cardiology Of  M.D.    Fibrillation



     [facility]      









 401.1  Hypertension Benign









 Office Visit  2014  9:30a  [facility]  Sarah Sellers,  427.31  Atrial



     Of [facility]  [place]    Fibrillation









 401.1  Hypertension Benign

 

 441.2  Aneurysm Thoracic W/O Rupture









 Office Visit  2014  7:45a  [facility]  Rosa Painting,  427.31  Atrial



     Cardiology Of  M.D.    Fibrillation



     [facility]      









 401.1  Hypertension Benign

 

 441.2  Aneurysm Thoracic W/O Rupture

 

 272.2  Hyperlipidemia Mixed









 Office Visit  2014  8:00a  [facility]  Rosa Painting,  427.31  Atrial



     Cardiology Of  M.D.    Fibrillation



     [facility]      









 441.2  Aneurysm Thoracic W/O Rupture

 

 272.2  Hyperlipidemia Mixed

 

 401.1  Hypertension Benign

 

 413.9  Angina Pectoris Other Unspec









 Office Visit  2013  8:00a  Orthopedic  Alvarado Wilks,  924.11  
Contusion Knee



     Services Of  M.D.    



     C.M.A.      

 

 Office Visit  04/10/2013  8:30a  Noy Wilks,  728.89  Muscle 
Disorders



     Services Of  LESLEY.YUNI.    Other



     C.M.A.      

 

 Office Visit  2012  9:45a  Noy Wilks,  728.89  Muscle 
Disorders



     Services Of  M.YUNI.    Other



     C.M.A.      

 

 Office Visit  2012  8:00a  Noy Wilks  719.46  Pain 
Joint Lower



     Services Of  M.D.    Leg



     C.M.A.      









 719.45  Pain Joint Pelvic Region & Thigh









 Office Visit  2012  9:00a  Noy Wilks  719.46  Pain 
Joint



     Services Of C.M.A.  M.D.    Lower Leg









 719.45  Pain Joint Pelvic Region & Thigh









 Office Visit  2012  9:00a  Orthopedic  Alvarado Wilks,  V54.89  Aftercare
,



     Services Of  M.D.    Orthopedic Other



     C.M.A.      









 728.89  Muscle Disorders Other







Plan of Treatment

Future Appointment(s):2019  8:30 am - Jessie Sanders NLINA. at [facility]2019 - Lesley Avila DNP, RN, FNP-BCG47.33 
Obstructive sleep apnea (adult) (pediatric)Comments:Sleep Apnea - 14  
NPSG AHI 47.9 (worse /hour), dominique oxygen 63% side LeftON CPAP 11 cmAHI 
1.3/hour, large leak  24 min/nightFollow up:1 yearRecommendations:Continue PAP 
device, Benefitting and compliant with treatment.  Cleaning Wipe off mask daily 
(baby wipe-no scent, or warm water) Clean mask, tubing, filter, and water 
chamber weekly in mild no scent dish soap and water. Hang to dry.    If you 
have any sleepiness while driving you MUST avoid operating a vehicle or 
machinery. If you have difficulty with your equipment, or need to replace your 
mask or hoses, please contact your homecare agency. A weight change of 20 
pounds or more may have an effect onyour equipment; if you are experiencing 
problems please call for an appointment.  If you have any further questions, 
please call the [facility] at [phone] Nasal 
congestionRecommendations:Consider seeing Dr. Tesfaye about sinus problems. 
Start Over-the-counter Neti pot in evening. (Bryant med or generic brand). Use 
with distilled water.Z68.28 Body mass index (BMI) 28.0-28.9, 
adultRecommendations:Avoid weight gain

## 2020-02-04 ENCOUNTER — HOSPITAL ENCOUNTER (EMERGENCY)
Dept: HOSPITAL 25 - ED | Age: 60
Discharge: HOME | End: 2020-02-04
Payer: COMMERCIAL

## 2020-02-04 VITALS — SYSTOLIC BLOOD PRESSURE: 129 MMHG | DIASTOLIC BLOOD PRESSURE: 85 MMHG

## 2020-02-04 DIAGNOSIS — E11.9: ICD-10-CM

## 2020-02-04 DIAGNOSIS — I10: ICD-10-CM

## 2020-02-04 DIAGNOSIS — R51: ICD-10-CM

## 2020-02-04 DIAGNOSIS — Z88.8: ICD-10-CM

## 2020-02-04 DIAGNOSIS — H43.392: ICD-10-CM

## 2020-02-04 DIAGNOSIS — Z79.84: ICD-10-CM

## 2020-02-04 DIAGNOSIS — H57.12: Primary | ICD-10-CM

## 2020-02-04 LAB
ALBUMIN SERPL BCG-MCNC: 4.2 G/DL (ref 3.2–5.2)
ALBUMIN/GLOB SERPL: 1.8 {RATIO} (ref 1–3)
ALP SERPL-CCNC: 38 U/L (ref 34–104)
ALT SERPL W P-5'-P-CCNC: 29 U/L (ref 7–52)
ANION GAP SERPL CALC-SCNC: 4 MMOL/L (ref 2–11)
AST SERPL-CCNC: 20 U/L (ref 13–39)
BASOPHILS # BLD AUTO: 0.1 10^3/UL (ref 0–0.2)
BUN SERPL-MCNC: 19 MG/DL (ref 6–24)
BUN/CREAT SERPL: 17.9 (ref 8–20)
CALCIUM SERPL-MCNC: 9.3 MG/DL (ref 8.6–10.3)
CHLORIDE SERPL-SCNC: 107 MMOL/L (ref 101–111)
EOSINOPHIL # BLD AUTO: 0.2 10^3/UL (ref 0–0.6)
GLOBULIN SER CALC-MCNC: 2.3 G/DL (ref 2–4)
GLUCOSE SERPL-MCNC: 108 MG/DL (ref 70–100)
HCO3 SERPL-SCNC: 26 MMOL/L (ref 22–32)
HCT VFR BLD AUTO: 42 % (ref 42–52)
HGB BLD-MCNC: 14.6 G/DL (ref 14–18)
LYMPHOCYTES # BLD AUTO: 1.7 10^3/UL (ref 1–4.8)
MCH RBC QN AUTO: 31 PG (ref 27–31)
MCHC RBC AUTO-ENTMCNC: 35 G/DL (ref 31–36)
MCV RBC AUTO: 89 FL (ref 80–94)
MONOCYTES # BLD AUTO: 0.8 10^3/UL (ref 0–0.8)
NEUTROPHILS # BLD AUTO: 2.9 10^3/UL (ref 1.5–7.7)
NRBC # BLD AUTO: 0 10^3/UL
NRBC BLD QL AUTO: 0.1
PLATELET # BLD AUTO: 188 10^3/UL (ref 150–450)
POTASSIUM SERPL-SCNC: 4.4 MMOL/L (ref 3.5–5)
PROT SERPL-MCNC: 6.5 G/DL (ref 6.4–8.9)
RBC # BLD AUTO: 4.76 10^6 /UL (ref 4.18–5.48)
SODIUM SERPL-SCNC: 137 MMOL/L (ref 135–145)
WBC # BLD AUTO: 5.6 10^3/UL (ref 3.5–10.8)

## 2020-02-04 PROCEDURE — 85025 COMPLETE CBC W/AUTO DIFF WBC: CPT

## 2020-02-04 PROCEDURE — 80053 COMPREHEN METABOLIC PANEL: CPT

## 2020-02-04 PROCEDURE — 85652 RBC SED RATE AUTOMATED: CPT

## 2020-02-04 PROCEDURE — 99282 EMERGENCY DEPT VISIT SF MDM: CPT

## 2020-02-04 PROCEDURE — 70450 CT HEAD/BRAIN W/O DYE: CPT

## 2020-02-04 PROCEDURE — 36415 COLL VENOUS BLD VENIPUNCTURE: CPT

## 2020-02-04 NOTE — XMS REPORT
Continuity of Care Document (CCD)

 Created on:2020



Patient:Ameya Palomo

Sex:Male

:1960

External Reference #:[id number]





Demographics







 Address  [address] [place] [address]

 

 Home Phone  [phone]

 

 Mobile Phone  [phone]

 

 Email Address  [email or web address]

 

 Preferred Language  en

 

 Marital Status  Not  or 

 

 Druze Affiliation  Unknown

 

 Race  White

 

 Ethnic Group  Not  or 









Author







 Name  Jessie Sanders N.P. (transmitted by agent of provider Lakshmi James)

 

 Address  [address] [place] [address]









Care Team Providers







 Name  Role  Phone

 

 Deuce Quinones MD - Family  Care Team Information   [phone]



 Medicine    









Problems







 Active Problems  Provider  Date

 

 Atrial fibrillation  Rosa Painting M.D.  Onset: 2014

 

 Aneurysm of thoracic aorta  Rosa Painting M.D.  Onset: 2014

 

 Mixed hyperlipidemia  Rosa Painting M.D.  Onset: 2014

 

 Benign essential hypertension  Rosa Painting M.D.  Onset: 2014

 

 Angina pectoris  Rosa Painting M.D.  Onset: 2014

 

 Hypersomnia with sleep apnea  Zak Gordon M.D.  Onset: 10/30/2014

 

 Obstructive sleep apnea syndrome  Zak Gordon M.D.  Onset: 2015

 

 Atrial flutter  Rosa Painting M.D.  Onset: 2015

 

 Essential hypertension  Rosa Painting M.D.  Onset: 2015

 

 Paroxysmal atrial fibrillation  Rosa Painting M.D.  Onset: 2017







Social History







 Type  Date  Description  Comments

 

 Birth Sex    Unknown  

 

 Tobacco Use  Start: Unknown  Never Smoked Cigarettes  

 

 Smoking Status  Reviewed: 20  Never Smoked Cigarettes  

 

 ETOH Use    Rarely consumes beer  

 

 Tobacco Use  Start: Unknown  Patient has never smoked  

 

 Recreational Drug Use    Denies Drug Use  

 

 Exercise Type/Frequency    Does not exercise  







Allergies, Adverse Reactions, Alerts







 Active Allergies  Reaction  Severity  Comments  Date

 

 Gabapentin  hallucinations  Severe  per [facility] EMR chart  2014









 Inactive Allergies









 NKDA        2013

 

 NKDA        2014

 

 Metoprolol      Per Dr. Allison see paper chart  2014







Medications







 Active Medications  SIG  Qnty  Indications  Ordering  Date



         Provider  

 

 Eliquis  1 by mouth twice a  180tabs  I48.0  Jessie S.  2020



      5mg Tablets  day      Foster, N.P.  



           

 

 Benazepril HCL  1 tab by mouth  90tabs    Jessie S.  2019



             20mg  daily      Foster, N.P.  



 Tablets          



           

 

 Metoprolol Succinate  1 by mouth every  90tabs  I71.2  Rosa Painting,  2019



 ER  day      M.D.  



 25mg Tablets ER 24HR          



           

 

 Metformin HCL  1 by mouth once a      Unknown  2016



            500mg  day in the a.m.        



 Tablets          



           

 

 Propafenone HCL  1 tablet by mouth  270tabs  I48.0  Rosa Painting,  2014



              150mg  three times a day      M.D.  



 Tablets          



           

 

 Potassium Chloride ER  1 tab po in am and  180caps    Rosa Painting,  2014



   1 tab po in pm      M.D.  



 10Meq Capsules ER          



           

 

 Pravachol  1 tablet by mouth  90tabs    Unknown  



        40mg Tablets  every night at        



   bedtime        

 

 Aspirin  1 by mouth every      Unknown  



      81mg Tablets  day        



           

 

 Aleve  1 by mouth twice a  60caps    Unknown  



    220mg Capsules  day as needed        



           

 

 Cpap        Unknown  



    Device          



           

 

 Levalbuterol HCL  1 unit nebulization      Unknown  



   every 12 hours as        



 1.25mg/0.5ML Nebulizer  needed        



           

 

 Albuterol Sulfate HFA  one puff every 6      Unknown  



   hours as needed        



 108(90Base) mcg/Act          



 Aerosol          



           

 

 Diphenhydramine HCL  as needed      Unknown  



                  25mg          



 Capsules          



           







Medications Administered in Office







 Medication  SIG  Qnty  Indications  Ordering Provider  Date

 

 Inj, Regadenoson, 0.1 MG        Luis E Allison M.D.,  2019



                  Injection        HELLEN AGUAYO  



           

 

 Technetium TC 99M        Luis E Allison M.D.,  2019



 Tetrofosmin, Per Unit Dose        HELLEN AGUAYO  



 Up To 40 Millicuries          



              Injection          



           







Immunizations







 Description

 

 No Information Available







Vital Signs







 Date  Vital  Result  Comment

 

 2020  8:30am  Height  72 inches  6'0"









 Weight  218.00 lb  with shoes

 

 Heart Rate  60 /min  

 

 BP Systolic Sitting  130 mmHg  lue reg cuff

 

 BP Diastolic Sitting  70 mmHg  lue reg cuff

 

 BP Systolic Standing  120 mmHg  lue reg cuff

 

 BP Diastolic Standing  70 mmHg  lue reg cuff

 

 Respiratory Rate  14 /min  

 

 BMI (Body Mass Index)  29.6 kg/m2  

 

 Ejection Fraction  55-60%  echo. 19  9:01am  Height  72 inches  6'0"









 Weight  211.00 lb  with shoes

 

 Heart Rate  64 /min  

 

 BP Systolic Sitting  110 mmHg  lue reg cuff

 

 BP Diastolic Sitting  68 mmHg  lue reg cuff

 

 BP Systolic Standing  120 mmHg  lue reg cuff

 

 BP Diastolic Standing  70 mmHg  lue reg cuff

 

 Respiratory Rate  16 /min  

 

 BMI (Body Mass Index)  28.6 kg/m2  

 

 Ejection Fraction  55-60%  echo. 19







Results







 Description

 

 No Information Available







Procedures







 Date  Code  Description  Status

 

 2020  66454  EKG Tracing & Interpretation  Completed

 

 2019  37477  EKG Tracing & Interpretation  Completed







Medical Devices







 Description

 

 No Information Available







Encounters







 Type  Date  Location  Provider  Dx  Diagnosis

 

 Office Visit  2020  [facility]  Jessie Sanders,  I48.0  
Paroxysmal atrial



   8:30a  Of [facility]  N.P.    fibrillation









 I71.2  Thoracic aortic aneurysm, without rupture

 

 I10  Essential (primary) hypertension

 

 E78.2  Mixed hyperlipidemia









 Office Visit  2019  9:00a  [facility]  Jessie SBernarda  I48.0  Paroxysmal 
atrial



     Of [facility]  Tommy, N.P.    fibrillation









 I71.2  Thoracic aortic aneurysm, without rupture

 

 I10  Essential (primary) hypertension

 

 E78.2  Mixed hyperlipidemia

 

 I44.0  Atrioventricular block, first degree









 Office Visit  2019  2:20p  [facility]  Rosa Painting,  I48.0  
Paroxysmal atrial



     Of [facility]  M.D.    fibrillation









 I71.2  Thoracic aortic aneurysm, without rupture

 

 I10  Essential (primary) hypertension

 

 E78.2  Mixed hyperlipidemia

 

 G47.33  Obstructive sleep apnea (adult) (pediatric)







Assessments







 Date  Code  Description  Provider

 

 2020  I48.0  Paroxysmal atrial fibrillation  Jessie Sanders, N.P.

 

 2020  I71.2  Thoracic aortic aneurysm, without rupture  Jessie Sanders, 
N.P.

 

 2020  I10  Essential (primary) hypertension  Jessie Sanders, N.P.

 

 2020  E78.2  Mixed hyperlipidemia  Jessie Sanders, N.P.

 

 2019  I48.0  Paroxysmal atrial fibrillation  Jessie ANGEL Tommy, N.P.

 

 2019  I71.2  Thoracic aortic aneurysm, without rupture  Jessie SBernarda Sanders, 
N.P.

 

 2019  I10  Essential (primary) hypertension  Jessie ANGEL Tommy, N.P.

 

 2019  E78.2  Mixed hyperlipidemia  Jessie Sanders, N.P.

 

 2019  I44.0  Atrioventricular block, first degree  Jessie Sanders, N.P.

 

 2019  I48.0  Paroxysmal atrial fibrillation  Rosa Painting M.D.

 

 2019  I71.2  Thoracic aortic aneurysm, without rupture  Rosa Painting M.D.

 

 2019  I10  Essential (primary) hypertension  Rosa Painting M.D.

 

 2019  E78.2  Mixed hyperlipidemia  Rosa Painting M.D.

 

 2019  G47.33  Obstructive sleep apnea (adult) (pediatric)  Rosa Painting M.D.







Plan of Treatment

2020 - Jessie DOMÍNGUEZBernarda Sanders, N.P.I48.0 Paroxysmal atrial fibrillationNew 
Medication:Eliquis 5 mg - 1 by mouth twice a dayNew Labs:Comp Metabolic Panel, 
Ordered: 20CBC Auto Diff, Ordered: 20Follow up:OV 6 mo LS w/ 
EKGRecommendations:Eliquis will protect you from having a stroke when you have 
breakthrough afib.  Cont propafenone.I71.2 Thoracic aortic aneurysm, without 
ruptureRecommendations:Aneurysm 4.2 Good BP control will help prevent this from 
getting bigger  We will plan on re-imaging some time between this summer and 
next .I10 Essential (primary) pmtipcrixyqjJ75.2 Mixed hyperlipidemia



Functional Status







 Description

 

 No Information Available







Mental Status







 Description

 

 No Information Available







Referrals







 Description

 

 No Information Available

## 2020-02-04 NOTE — ED
Neurological HPI





- HPI Summary


HPI Summary: 


Patient is a 58 y/o diabetic male presenting to [facility] with complaints of HA and 

black spots to left eye w/ pain. He states that  he awoke with the HA yesterday 

morning and decided to stay home from work as a result. He states that he felt 

better this morning and decided to go to work. However, HA worsened throughout 

the day. Patient also had onset black spots to his left eye as well as pain. 

Visual changes are still present. No Hx of similar episodes of Sx noted. 

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 N/V, weakness, neck stiffness

, fever are denied. Patient does report increased fatigue and slight 

photophobia. No sensitivity to sound noted. PMHx of afib for which the patient 

is followed by Dr. Painting. Patient takes ASA and is prescribed Eliquis 

recently. He has not started Eliquis yet. FMHx of CVA noted. Gabapentin allergy 

noted. He is a non-smoker and denies alcohol and substance usage. Home 

medications and allergies are reviewed. 





- History of Current Complaint


Chief Complaint: EDEyeProblem


Stated Complaint: POSS DETACHED RETINA PER PT


Time Seen by Provider: 02/04/20 16:10


Hx Obtained From: Patient


Onset/Duration: Started days ago - HA, Still Present


Timing: Constant


Current Severity: Moderate


Neurological Deficit Location: Facial - vision changes at left eye


Pain Intensity: 6


Pain Scale Used: 0-10 Numeric


Character: Visual Changes, Other: - HA, left eye pain


Associated Signs and Symptoms: Positive: Visual Changes, Headache.  Negative: 

Weakness, Nausea/Vomiting, Fever, Neck Pain/Stiffness





- Allergy/Home Medications


Allergies/Adverse Reactions: 


 Allergies











Allergy/AdvReac Type Severity Reaction Status Date / Time


 


gabapentin Allergy  Hallucinati Verified 01/24/19 02:01





   ons  














PMH/Surg Hx/FS Hx/Imm Hx


Endocrine/Hematology History: Reports: Hx Diabetes


   Denies: Hx Anticoagulant Therapy, Hx Anemia


Cardiovascular History: Reports: Hx Hypertension, Other Cardiovascular Problems/

Disorders - afib


Respiratory History: Reports: Hx Asthma, Hx Seasonal Allergies


GI History: 


   Denies: Hx Jaundice


 History: 


   Denies: Hx Renal Disease


Musculoskeletal History: Reports: Hx Orthopedic Injury - broken leg (2009)


Sensory History: Reports: Hx Contacts or Glasses


Opthamlomology History: Reports: Hx Contacts or Glasses





- Surgical History


Surgery Procedure, Year, and Place: orth, HIP AND FEMUR RECONSTRUCTION


Infectious Disease History: No


Infectious Disease History: 


   Denies: Traveled Outside the [place] in Last 30 Days





- Family History


Known Family History: 


   Negative: Cardiac Disease





- Social History


Alcohol Use: Occasionally


Substance Use Type: Reports: None


Smoking Status (MU): Never Smoked Tobacco


Have You Smoked in the Last Year: No





Review of Systems


Positive: Fatigue.  Negative: Fever


Eyes: Other - positive - dark spot in left eye, left eye pain 


Positive: Photophobia


ENT: Other - negative - sensitivity for sound 


Negative: Vomiting, Nausea


Musculoskeletal: Other - negative - neck stiffness 


Positive: Headache.  Negative: Weakness


All Other Systems Reviewed And Are Negative: Yes





Physical Exam





- Summary


Physical Exam Summary: 


Constitutional: Well-developed, Well-nourished, Alert. (-) Distressed


Skin: Warm, Dry


HENT: Normocephalic; Atraumatic


Eyes: Conjunctiva normal


Neck: Musculoskeletal ROM normal neck. (-) JVD, (-) Stridor, (-) Tracheal 

deviation


Cardio: Rhythm regular, rate normal, Heart sounds normal; Intact distal pulses; 

Radial pulses are 2+ and symmetric. (-) Murmur


Pulmonary/Chest wall: Effort normal. (-) Respiratory distress, (-) Wheezes, (-) 

Rales


Abd: Soft, (-) tenderness, (-) Distension, (-) Guarding, (-) Rebound


Musculoskeletal: (-) Edema


Lymph: (-) Cervical adenopathy


Neuro: Alert, Oriented x3, GCS 15, NIH 0 


Psych: Mood and affect Normal





Triage Information Reviewed: Yes


Vital Signs On Initial Exam: 


 Initial Vitals











Temp Pulse Resp BP Pulse Ox


 


 97.3 F   57   18   129/73   96 


 


 02/04/20 15:01  02/04/20 15:01  02/04/20 15:01  02/04/20 15:01  02/04/20 15:01











Vital Signs Reviewed: Yes





- [place] Coma Scale


Best Eye Response: 4 - Spontaneous


Best Motor Response: 6 - Obeys Commands


Best Verbal Response: 5 - Oriented


Coma Scale Total: 15





Procedures





- Sedation


Patient Received Moderate/Deep Sedation with Procedure: No





Diagnostics





- Vital Signs


 Vital Signs











  Temp Pulse Resp BP Pulse Ox


 


 02/04/20 15:01  97.3 F  57  18  129/73  96














- Laboratory


Result Diagrams: 


 02/04/20 17:05





 02/04/20 17:05


Lab Statement: Any lab studies that have been ordered have been reviewed, and 

results considered in the medical decision making process.





- CT


  ** BRAIN CT


CT Interpretation Completed By: Radiologist


Summary of CT Findings: IMPRESSION:  1.  No acute intracranial abnormality by 

CT.  THIS REPORT WAS REVIEWED BY ED PHYSICIAN.





NIH Scale





- NIH Scale


Level of Consciousness: Alert/Keenly Responsive


Ask Patient the Month and His/Her Age: Both Correct


Ask Pt to Open/Close Eyes and /Release Non-Paretic Hand: Both Correctly


Best Gaze (Only Horizontal Eye Movement): Normal


Visual Field Testing: No Visual Loss


Facial Paresis-Pt to Smile & Close Eyes or Grimace Symmetry: Normal/Symmetrical


Motor Function - Right Arm: No Drift-Holds 10 Seconds


Motor Function - Left Arm: No Drift-Holds 10 Seconds


Motor Function - Right Leg: No Drift-Holds 10 Seconds


Motor Function - Left Leg: No Drift-Holds 10 Seconds


Limb Ataxia-Must be out of Proportion to Weakness Present: Absent


Sensory (Use Pinprick to Test Arms/Legs/Trunk/Face): Normal


Best Language (Describe Picture, Name Items): No Aphasia


Dysarthria (Read Several Words): Normal


Extinction and Inattention: No Abnormality


Total Score: 0





Re-Evaluation





- Re-Evaluation


  ** First Eval


Re-Evaluation Time: 16:43


Comment: Patient has 20/25 vision bilaterally with glasses per nurse Bubba.





  ** Second Eval


Re-Evaluation Time: 18:00


Comment: Intra-ocular pressure of 32 bilaterally.





  ** Third Eval


Re-Evaluation Time: 19:26


Change: Improved


Comment: HA is resolved at this time





Course/Dx





- Course


Course Of Treatment: Patient is here with pain in his left eye with some 

blurred vision.  Upon arrival to the room, patient's blurred vision was mostly 

gone.  Patient had normal visual acuity in both eyes.  Patient had a CT brain 

which is negative.  Patient had equal intraocular pressures bilaterally.  

Patient had a bedside ultrasound which showed no retinal detachment.  Patient 

had blood performed including an ESR which was normal.  Patient's headache went 

away with one dose of Fioricet.  Patient was given neurology for follow-up





- Diagnoses


Provider Diagnoses: 


 Left eye pain, Blurring, left eye








Discharge ED





- Sign-Out/Discharge


Documenting (check all that apply): Patient Departure - discharge 





- Discharge Plan


Condition: Stable


Disposition: HOME


Patient Education Materials:  Blurred Vision (ED), Eye Pain (ED)


Referrals: 


Xiang Schumacher MD [Medical Doctor] - 


Deuce Quinones MD [Primary Care Provider] - 


Additional Instructions: 


PLEASE FOLLOW UP WITH NEUROLOGY AND YOUR PRIMARY CARE PHYSICIAN WITHIN THREE 

DAYS. PLEASE RETURN TO THE ED FOR ANY UNILATERAL WEAKNESS, SLURRED SPEECH, 

SEVERE HEADACHE, OR OTHER CONCERNING SYMPTOMS. 





- Billing Disposition and Condition


Condition: STABLE


Disposition: Home





- Attestation Statements


Document Initiated by [place]: Yes


Documenting Scribe: HARMAN HILLIARD


Provider For Whom Alexandria is Documenting (Include Credential): DIANE SERNA MD


Scribe Attestation: 


HARMAN WALTON, scribed for DIANE SERNA MD on 02/04/20 at 2114. 


Scribe Documentation Reviewed: Yes


Provider Attestation: 


The documentation as recorded by the HARMAN mitchell accurately reflects 

the service I personally performed and the decisions made by me, DIANE SERNA MD


Status of Scribe Document: Viewed